# Patient Record
Sex: MALE | Race: WHITE | NOT HISPANIC OR LATINO | URBAN - METROPOLITAN AREA
[De-identification: names, ages, dates, MRNs, and addresses within clinical notes are randomized per-mention and may not be internally consistent; named-entity substitution may affect disease eponyms.]

---

## 2017-11-09 ENCOUNTER — EMERGENCY (EMERGENCY)
Facility: HOSPITAL | Age: 47
LOS: 0 days | Discharge: HOME | End: 2017-11-09

## 2017-11-09 DIAGNOSIS — I10 ESSENTIAL (PRIMARY) HYPERTENSION: ICD-10-CM

## 2017-11-09 DIAGNOSIS — Z79.899 OTHER LONG TERM (CURRENT) DRUG THERAPY: ICD-10-CM

## 2017-11-09 DIAGNOSIS — R55 SYNCOPE AND COLLAPSE: ICD-10-CM

## 2017-11-09 DIAGNOSIS — R73.9 HYPERGLYCEMIA, UNSPECIFIED: ICD-10-CM

## 2017-11-09 DIAGNOSIS — F17.200 NICOTINE DEPENDENCE, UNSPECIFIED, UNCOMPLICATED: ICD-10-CM

## 2017-11-09 DIAGNOSIS — E11.9 TYPE 2 DIABETES MELLITUS WITHOUT COMPLICATIONS: ICD-10-CM

## 2017-11-09 DIAGNOSIS — F32.9 MAJOR DEPRESSIVE DISORDER, SINGLE EPISODE, UNSPECIFIED: ICD-10-CM

## 2017-11-09 DIAGNOSIS — J06.9 ACUTE UPPER RESPIRATORY INFECTION, UNSPECIFIED: ICD-10-CM

## 2017-12-24 ENCOUNTER — TRANSCRIPTION ENCOUNTER (OUTPATIENT)
Age: 47
End: 2017-12-24

## 2018-06-27 ENCOUNTER — TRANSCRIPTION ENCOUNTER (OUTPATIENT)
Age: 48
End: 2018-06-27

## 2018-12-06 NOTE — ASU PATIENT PROFILE, ADULT - PMH
HTN (hypertension) Current mild episode of major depressive disorder, unspecified whether recurrent    HTN (hypertension)    Hyperlipidemia, unspecified hyperlipidemia type    Type 2 diabetes mellitus without complication, without long-term current use of insulin

## 2018-12-07 ENCOUNTER — OUTPATIENT (OUTPATIENT)
Dept: OUTPATIENT SERVICES | Facility: HOSPITAL | Age: 48
LOS: 1 days | Discharge: HOME | End: 2018-12-07

## 2018-12-07 DIAGNOSIS — E11.9 TYPE 2 DIABETES MELLITUS WITHOUT COMPLICATIONS: ICD-10-CM

## 2018-12-07 DIAGNOSIS — I10 ESSENTIAL (PRIMARY) HYPERTENSION: ICD-10-CM

## 2018-12-07 DIAGNOSIS — R94.39 ABNORMAL RESULT OF OTHER CARDIOVASCULAR FUNCTION STUDY: ICD-10-CM

## 2018-12-07 DIAGNOSIS — I25.10 ATHEROSCLEROTIC HEART DISEASE OF NATIVE CORONARY ARTERY WITHOUT ANGINA PECTORIS: ICD-10-CM

## 2018-12-07 DIAGNOSIS — F17.210 NICOTINE DEPENDENCE, CIGARETTES, UNCOMPLICATED: ICD-10-CM

## 2018-12-07 DIAGNOSIS — F32.9 MAJOR DEPRESSIVE DISORDER, SINGLE EPISODE, UNSPECIFIED: ICD-10-CM

## 2018-12-07 LAB — GLUCOSE BLDC GLUCOMTR-MCNC: 136 MG/DL — HIGH (ref 70–99)

## 2018-12-07 RX ORDER — METFORMIN HYDROCHLORIDE 850 MG/1
1 TABLET ORAL
Qty: 0 | Refills: 0 | COMMUNITY

## 2018-12-07 RX ORDER — ATORVASTATIN CALCIUM 80 MG/1
1 TABLET, FILM COATED ORAL
Qty: 30 | Refills: 0
Start: 2018-12-07 | End: 2019-01-05

## 2018-12-07 RX ORDER — ATENOLOL 25 MG/1
1 TABLET ORAL
Qty: 0 | Refills: 0 | COMMUNITY

## 2018-12-07 NOTE — CHART NOTE - NSCHARTNOTEFT_GEN_A_CORE
PRE-OP DIAGNOSIS: suspected CAD    PROCEDURE: [facility]     Physician: Dr. Regalado   Assistant: Dr. Mota    ANESTHESIA TYPE:  [  ]General Anesthesia  [  x] Sedation  [ x] Local/Regional    ESTIMATED BLOOD LOSS:   < 10 mL    CONDITION  [  ] Critical  [  ] Serious  [  ]Fair  [  x]Good      FINDINGS      LEFT HEART CATHETERIZATION       LVEF- 6-%                                 Left main- 30 % stenosis in distal LM     LAD:  normal                       Diag: normal     Left Circumflex: normal   OM: normal     Right Coronary Artery: 40 % stenosis in mid RCA  RPDA  RPL    Ramus Intermed: normal     INTERVENTION  IMPLANTS: none       POST-OP DIAGNOSIS    Non obstructive CAD        PLAN OF CARE  [ x] D/C Home today  [ ]  D/C in AM  [ ] Return to In-patient bed  [ ] Admit for observation  [ ] Return for staged procedure:  [ ] CT Surgery consult called  [ x] ASA, B-blocker & Statin therapy PRE-OP DIAGNOSIS: suspected CAD    PROCEDURE: [facility]     Physician: Dr. Regalado   Assistant: Dr. Mota    ANESTHESIA TYPE:  [  ]General Anesthesia  [  x] Sedation  [ x] Local/Regional    ESTIMATED BLOOD LOSS:   < 10 mL    CONDITION  [  ] Critical  [  ] Serious  [  ]Fair  [  x]Good      FINDINGS      LEFT HEART CATHETERIZATION       LVEF- 60 %                                 Left main- 30 % stenosis in distal LM     LAD:  normal                       Diag: normal     Left Circumflex: normal   OM: normal     Right Coronary Artery: 40 % stenosis in mid RCA  RPDA  RPL    Ramus Intermed: normal     INTERVENTION  IMPLANTS: none       POST-OP DIAGNOSIS    Non obstructive CAD        PLAN OF CARE  [ x] D/C Home today  [ ]  D/C in AM  [ ] Return to In-patient bed  [ ] Admit for observation  [ ] Return for staged procedure:  [ ] CT Surgery consult called  [ x] ASA, B-blocker & Statin therapy PRE-OP DIAGNOSIS: suspected CAD    PROCEDURE: [facility]     Physician: Dr. Regalado   Assistant: Dr. Mota    ANESTHESIA TYPE:  [  ]General Anesthesia  [  x] Sedation  [ x] Local/Regional    ESTIMATED BLOOD LOSS:   < 10 mL    CONDITION  [  ] Critical  [  ] Serious  [  ]Fair  [  x]Good      FINDINGS      LEFT HEART CATHETERIZATION       LVEF- 60 %                                 Left main- 30 % stenosis in distal LM     LAD:  normal                       Diag: normal     Left Circumflex: normal   OM: normal     Right Coronary Artery: 40 % stenosis in mid RCA  RPDA: normal  RPL: normal     Ramus Intermed: normal     INTERVENTION  IMPLANTS: none       POST-OP DIAGNOSIS    Non obstructive CAD        PLAN OF CARE  [ x] D/C Home today  [ ]  D/C in AM  [ ] Return to In-patient bed  [ ] Admit for observation  [ ] Return for staged procedure:  [ ] CT Surgery consult called  [ x] ASA, B-blocker & Statin therapy

## 2018-12-07 NOTE — H&P CARDIOLOGY - HISTORY OF PRESENT ILLNESS
Patient is a 48y Male PMH: HLD, depression, +smoker 1PPD x 30 years, DM, migraines, "spot on kidney". pt had abnormal nuclear stress test 3/18 with anterior ischemia. ws recently hospitalized for CP in [place] and signed out. Pt followed up with Dr. Valdovinos and [facility] recommended         REVIEW OF SYSTEMS:  CONSTITUTIONAL: No fever, weight loss, or fatigue  CARDIOLOGY: PAtient denies chest pain, shortness of breath or syncopal episodes.   RESPIRATORY: denies shortness of breath, wheezing   NEUROLOGICAL: NO weakness, no focal deficits to report.  ENDOCRINOLOGICAL: no recent change in diabetic medications.   GI: no BRBPR, no N,V,diarrhea.     PHYSICAL EXAM:  · CONSTITUTIONAL:	Well-developed, well nourished     ·RESPIRATORY:   airway patent; breath sounds equal; good air movement; respirations non-labored; clear to auscultation bilaterally; no chest wall tenderness; no intercostal retractions; no rales,rhonchi or wheeze  · CARDIOVASCULAR	regular rate and rhythm  no rub  no murmur  normal PMI  · EXTREMITIES: No cyanosis, clubbing or edema  · VASCULAR: 	Equal and normal pulses (carotid, femoral, dorsalis pedis)

## 2019-08-20 ENCOUNTER — EMERGENCY (EMERGENCY)
Facility: HOSPITAL | Age: 49
LOS: 0 days | Discharge: LEFT BEFORE DISCHARGE | End: 2019-08-20
Attending: EMERGENCY MEDICINE | Admitting: EMERGENCY MEDICINE

## 2019-08-20 ENCOUNTER — OUTPATIENT (OUTPATIENT)
Dept: OUTPATIENT SERVICES | Facility: HOSPITAL | Age: 49
LOS: 1 days | Discharge: HOME | End: 2019-08-20

## 2019-08-20 VITALS
DIASTOLIC BLOOD PRESSURE: 127 MMHG | SYSTOLIC BLOOD PRESSURE: 217 MMHG | HEART RATE: 112 BPM | OXYGEN SATURATION: 98 % | TEMPERATURE: 97 F | RESPIRATION RATE: 20 BRPM

## 2019-08-20 DIAGNOSIS — I10 ESSENTIAL (PRIMARY) HYPERTENSION: ICD-10-CM

## 2019-08-20 DIAGNOSIS — K02.62 DENTAL CARIES ON SMOOTH SURFACE PENETRATING INTO DENTIN: ICD-10-CM

## 2019-08-20 DIAGNOSIS — Z79.82 LONG TERM (CURRENT) USE OF ASPIRIN: ICD-10-CM

## 2019-08-20 DIAGNOSIS — Z79.899 OTHER LONG TERM (CURRENT) DRUG THERAPY: ICD-10-CM

## 2019-08-20 PROBLEM — F32.0 MAJOR DEPRESSIVE DISORDER, SINGLE EPISODE, MILD: Chronic | Status: ACTIVE | Noted: 2018-12-07

## 2019-08-20 PROBLEM — E78.5 HYPERLIPIDEMIA, UNSPECIFIED: Chronic | Status: ACTIVE | Noted: 2018-12-07

## 2019-08-20 PROBLEM — E11.9 TYPE 2 DIABETES MELLITUS WITHOUT COMPLICATIONS: Chronic | Status: ACTIVE | Noted: 2018-12-07

## 2019-08-20 NOTE — ED ADULT TRIAGE NOTE - CHIEF COMPLAINT QUOTE
pt has hypertension has not taken his bp meds in three days, pt has family member in hosptial and is worried about them

## 2023-11-28 ENCOUNTER — INPATIENT (INPATIENT)
Facility: HOSPITAL | Age: 53
LOS: 1 days | Discharge: LEFT BEFORE DISCHARGE | DRG: 190 | End: 2023-11-30
Attending: STUDENT IN AN ORGANIZED HEALTH CARE EDUCATION/TRAINING PROGRAM | Admitting: STUDENT IN AN ORGANIZED HEALTH CARE EDUCATION/TRAINING PROGRAM
Payer: MEDICAID

## 2023-11-28 VITALS
DIASTOLIC BLOOD PRESSURE: 101 MMHG | OXYGEN SATURATION: 97 % | SYSTOLIC BLOOD PRESSURE: 216 MMHG | WEIGHT: 270.07 LBS | HEIGHT: 69 IN | RESPIRATION RATE: 20 BRPM | TEMPERATURE: 98 F | HEART RATE: 71 BPM

## 2023-11-28 DIAGNOSIS — R07.9 CHEST PAIN, UNSPECIFIED: ICD-10-CM

## 2023-11-28 LAB
ALBUMIN SERPL ELPH-MCNC: 4.2 G/DL — SIGNIFICANT CHANGE UP (ref 3.5–5.2)
ALBUMIN SERPL ELPH-MCNC: 4.2 G/DL — SIGNIFICANT CHANGE UP (ref 3.5–5.2)
ALP SERPL-CCNC: 113 U/L — SIGNIFICANT CHANGE UP (ref 30–115)
ALP SERPL-CCNC: 113 U/L — SIGNIFICANT CHANGE UP (ref 30–115)
ALT FLD-CCNC: 31 U/L — SIGNIFICANT CHANGE UP (ref 0–41)
ALT FLD-CCNC: 31 U/L — SIGNIFICANT CHANGE UP (ref 0–41)
ANION GAP SERPL CALC-SCNC: 12 MMOL/L — SIGNIFICANT CHANGE UP (ref 7–14)
ANION GAP SERPL CALC-SCNC: 12 MMOL/L — SIGNIFICANT CHANGE UP (ref 7–14)
AST SERPL-CCNC: 23 U/L — SIGNIFICANT CHANGE UP (ref 0–41)
AST SERPL-CCNC: 23 U/L — SIGNIFICANT CHANGE UP (ref 0–41)
BASOPHILS # BLD AUTO: 0.08 K/UL — SIGNIFICANT CHANGE UP (ref 0–0.2)
BASOPHILS # BLD AUTO: 0.08 K/UL — SIGNIFICANT CHANGE UP (ref 0–0.2)
BASOPHILS NFR BLD AUTO: 0.7 % — SIGNIFICANT CHANGE UP (ref 0–1)
BASOPHILS NFR BLD AUTO: 0.7 % — SIGNIFICANT CHANGE UP (ref 0–1)
BILIRUB SERPL-MCNC: 0.4 MG/DL — SIGNIFICANT CHANGE UP (ref 0.2–1.2)
BILIRUB SERPL-MCNC: 0.4 MG/DL — SIGNIFICANT CHANGE UP (ref 0.2–1.2)
BUN SERPL-MCNC: 25 MG/DL — HIGH (ref 10–20)
BUN SERPL-MCNC: 25 MG/DL — HIGH (ref 10–20)
CALCIUM SERPL-MCNC: 9.8 MG/DL — SIGNIFICANT CHANGE UP (ref 8.4–10.5)
CALCIUM SERPL-MCNC: 9.8 MG/DL — SIGNIFICANT CHANGE UP (ref 8.4–10.5)
CHLORIDE SERPL-SCNC: 104 MMOL/L — SIGNIFICANT CHANGE UP (ref 98–110)
CHLORIDE SERPL-SCNC: 104 MMOL/L — SIGNIFICANT CHANGE UP (ref 98–110)
CO2 SERPL-SCNC: 26 MMOL/L — SIGNIFICANT CHANGE UP (ref 17–32)
CO2 SERPL-SCNC: 26 MMOL/L — SIGNIFICANT CHANGE UP (ref 17–32)
CREAT SERPL-MCNC: 1.5 MG/DL — SIGNIFICANT CHANGE UP (ref 0.7–1.5)
CREAT SERPL-MCNC: 1.5 MG/DL — SIGNIFICANT CHANGE UP (ref 0.7–1.5)
EGFR: 55 ML/MIN/1.73M2 — LOW
EGFR: 55 ML/MIN/1.73M2 — LOW
EOSINOPHIL # BLD AUTO: 0.29 K/UL — SIGNIFICANT CHANGE UP (ref 0–0.7)
EOSINOPHIL # BLD AUTO: 0.29 K/UL — SIGNIFICANT CHANGE UP (ref 0–0.7)
EOSINOPHIL NFR BLD AUTO: 2.7 % — SIGNIFICANT CHANGE UP (ref 0–8)
EOSINOPHIL NFR BLD AUTO: 2.7 % — SIGNIFICANT CHANGE UP (ref 0–8)
GLUCOSE SERPL-MCNC: 118 MG/DL — HIGH (ref 70–99)
GLUCOSE SERPL-MCNC: 118 MG/DL — HIGH (ref 70–99)
HCT VFR BLD CALC: 49.4 % — SIGNIFICANT CHANGE UP (ref 42–52)
HCT VFR BLD CALC: 49.4 % — SIGNIFICANT CHANGE UP (ref 42–52)
HGB BLD-MCNC: 16.8 G/DL — SIGNIFICANT CHANGE UP (ref 14–18)
HGB BLD-MCNC: 16.8 G/DL — SIGNIFICANT CHANGE UP (ref 14–18)
IMM GRANULOCYTES NFR BLD AUTO: 1.2 % — HIGH (ref 0.1–0.3)
IMM GRANULOCYTES NFR BLD AUTO: 1.2 % — HIGH (ref 0.1–0.3)
LYMPHOCYTES # BLD AUTO: 1.97 K/UL — SIGNIFICANT CHANGE UP (ref 1.2–3.4)
LYMPHOCYTES # BLD AUTO: 1.97 K/UL — SIGNIFICANT CHANGE UP (ref 1.2–3.4)
LYMPHOCYTES # BLD AUTO: 18.2 % — LOW (ref 20.5–51.1)
LYMPHOCYTES # BLD AUTO: 18.2 % — LOW (ref 20.5–51.1)
MAGNESIUM SERPL-MCNC: 2.3 MG/DL — SIGNIFICANT CHANGE UP (ref 1.8–2.4)
MAGNESIUM SERPL-MCNC: 2.3 MG/DL — SIGNIFICANT CHANGE UP (ref 1.8–2.4)
MCHC RBC-ENTMCNC: 29.7 PG — SIGNIFICANT CHANGE UP (ref 27–31)
MCHC RBC-ENTMCNC: 29.7 PG — SIGNIFICANT CHANGE UP (ref 27–31)
MCHC RBC-ENTMCNC: 34 G/DL — SIGNIFICANT CHANGE UP (ref 32–37)
MCHC RBC-ENTMCNC: 34 G/DL — SIGNIFICANT CHANGE UP (ref 32–37)
MCV RBC AUTO: 87.4 FL — SIGNIFICANT CHANGE UP (ref 80–94)
MCV RBC AUTO: 87.4 FL — SIGNIFICANT CHANGE UP (ref 80–94)
MONOCYTES # BLD AUTO: 0.83 K/UL — HIGH (ref 0.1–0.6)
MONOCYTES # BLD AUTO: 0.83 K/UL — HIGH (ref 0.1–0.6)
MONOCYTES NFR BLD AUTO: 7.7 % — SIGNIFICANT CHANGE UP (ref 1.7–9.3)
MONOCYTES NFR BLD AUTO: 7.7 % — SIGNIFICANT CHANGE UP (ref 1.7–9.3)
NEUTROPHILS # BLD AUTO: 7.5 K/UL — HIGH (ref 1.4–6.5)
NEUTROPHILS # BLD AUTO: 7.5 K/UL — HIGH (ref 1.4–6.5)
NEUTROPHILS NFR BLD AUTO: 69.5 % — SIGNIFICANT CHANGE UP (ref 42.2–75.2)
NEUTROPHILS NFR BLD AUTO: 69.5 % — SIGNIFICANT CHANGE UP (ref 42.2–75.2)
NRBC # BLD: 0 /100 WBCS — SIGNIFICANT CHANGE UP (ref 0–0)
NRBC # BLD: 0 /100 WBCS — SIGNIFICANT CHANGE UP (ref 0–0)
NT-PROBNP SERPL-SCNC: 1691 PG/ML — HIGH (ref 0–300)
NT-PROBNP SERPL-SCNC: 1691 PG/ML — HIGH (ref 0–300)
PLATELET # BLD AUTO: 234 K/UL — SIGNIFICANT CHANGE UP (ref 130–400)
PLATELET # BLD AUTO: 234 K/UL — SIGNIFICANT CHANGE UP (ref 130–400)
PMV BLD: 11.5 FL — HIGH (ref 7.4–10.4)
PMV BLD: 11.5 FL — HIGH (ref 7.4–10.4)
POTASSIUM SERPL-MCNC: 4.8 MMOL/L — SIGNIFICANT CHANGE UP (ref 3.5–5)
POTASSIUM SERPL-MCNC: 4.8 MMOL/L — SIGNIFICANT CHANGE UP (ref 3.5–5)
POTASSIUM SERPL-SCNC: 4.8 MMOL/L — SIGNIFICANT CHANGE UP (ref 3.5–5)
POTASSIUM SERPL-SCNC: 4.8 MMOL/L — SIGNIFICANT CHANGE UP (ref 3.5–5)
PROT SERPL-MCNC: 6.8 G/DL — SIGNIFICANT CHANGE UP (ref 6–8)
PROT SERPL-MCNC: 6.8 G/DL — SIGNIFICANT CHANGE UP (ref 6–8)
RBC # BLD: 5.65 M/UL — SIGNIFICANT CHANGE UP (ref 4.7–6.1)
RBC # BLD: 5.65 M/UL — SIGNIFICANT CHANGE UP (ref 4.7–6.1)
RBC # FLD: 14.3 % — SIGNIFICANT CHANGE UP (ref 11.5–14.5)
RBC # FLD: 14.3 % — SIGNIFICANT CHANGE UP (ref 11.5–14.5)
SODIUM SERPL-SCNC: 142 MMOL/L — SIGNIFICANT CHANGE UP (ref 135–146)
SODIUM SERPL-SCNC: 142 MMOL/L — SIGNIFICANT CHANGE UP (ref 135–146)
TROPONIN T SERPL-MCNC: 0.02 NG/ML — HIGH
WBC # BLD: 10.8 K/UL — SIGNIFICANT CHANGE UP (ref 4.8–10.8)
WBC # BLD: 10.8 K/UL — SIGNIFICANT CHANGE UP (ref 4.8–10.8)
WBC # FLD AUTO: 10.8 K/UL — SIGNIFICANT CHANGE UP (ref 4.8–10.8)
WBC # FLD AUTO: 10.8 K/UL — SIGNIFICANT CHANGE UP (ref 4.8–10.8)

## 2023-11-28 PROCEDURE — 93005 ELECTROCARDIOGRAM TRACING: CPT

## 2023-11-28 PROCEDURE — 85730 THROMBOPLASTIN TIME PARTIAL: CPT

## 2023-11-28 PROCEDURE — 85027 COMPLETE CBC AUTOMATED: CPT

## 2023-11-28 PROCEDURE — C1769: CPT

## 2023-11-28 PROCEDURE — 85610 PROTHROMBIN TIME: CPT

## 2023-11-28 PROCEDURE — 92978 ENDOLUMINL IVUS OCT C 1ST: CPT | Mod: LM

## 2023-11-28 PROCEDURE — 84443 ASSAY THYROID STIM HORMONE: CPT

## 2023-11-28 PROCEDURE — C1894: CPT

## 2023-11-28 PROCEDURE — 36415 COLL VENOUS BLD VENIPUNCTURE: CPT

## 2023-11-28 PROCEDURE — 93306 TTE W/DOPPLER COMPLETE: CPT

## 2023-11-28 PROCEDURE — 82962 GLUCOSE BLOOD TEST: CPT

## 2023-11-28 PROCEDURE — 83036 HEMOGLOBIN GLYCOSYLATED A1C: CPT

## 2023-11-28 PROCEDURE — 93458 L HRT ARTERY/VENTRICLE ANGIO: CPT

## 2023-11-28 PROCEDURE — 80061 LIPID PANEL: CPT

## 2023-11-28 PROCEDURE — C1753: CPT

## 2023-11-28 PROCEDURE — 71045 X-RAY EXAM CHEST 1 VIEW: CPT | Mod: 26

## 2023-11-28 PROCEDURE — 80048 BASIC METABOLIC PNL TOTAL CA: CPT

## 2023-11-28 PROCEDURE — 83735 ASSAY OF MAGNESIUM: CPT

## 2023-11-28 PROCEDURE — C1887: CPT

## 2023-11-28 PROCEDURE — 99285 EMERGENCY DEPT VISIT HI MDM: CPT

## 2023-11-28 PROCEDURE — 84484 ASSAY OF TROPONIN QUANT: CPT

## 2023-11-28 PROCEDURE — 93010 ELECTROCARDIOGRAM REPORT: CPT

## 2023-11-28 RX ORDER — DEXTROSE 50 % IN WATER 50 %
15 SYRINGE (ML) INTRAVENOUS ONCE
Refills: 0 | Status: DISCONTINUED | OUTPATIENT
Start: 2023-11-28 | End: 2023-11-30

## 2023-11-28 RX ORDER — SODIUM CHLORIDE 9 MG/ML
1000 INJECTION, SOLUTION INTRAVENOUS
Refills: 0 | Status: DISCONTINUED | OUTPATIENT
Start: 2023-11-28 | End: 2023-11-30

## 2023-11-28 RX ORDER — ATORVASTATIN CALCIUM 80 MG/1
40 TABLET, FILM COATED ORAL AT BEDTIME
Refills: 0 | Status: DISCONTINUED | OUTPATIENT
Start: 2023-11-28 | End: 2023-11-29

## 2023-11-28 RX ORDER — ATENOLOL 25 MG/1
1 TABLET ORAL
Refills: 0 | DISCHARGE

## 2023-11-28 RX ORDER — DEXTROSE 50 % IN WATER 50 %
25 SYRINGE (ML) INTRAVENOUS ONCE
Refills: 0 | Status: DISCONTINUED | OUTPATIENT
Start: 2023-11-28 | End: 2023-11-30

## 2023-11-28 RX ORDER — DEXTROSE 50 % IN WATER 50 %
12.5 SYRINGE (ML) INTRAVENOUS ONCE
Refills: 0 | Status: DISCONTINUED | OUTPATIENT
Start: 2023-11-28 | End: 2023-11-30

## 2023-11-28 RX ORDER — AMLODIPINE BESYLATE 2.5 MG/1
5 TABLET ORAL DAILY
Refills: 0 | Status: DISCONTINUED | OUTPATIENT
Start: 2023-11-28 | End: 2023-11-29

## 2023-11-28 RX ORDER — ALBUTEROL 90 UG/1
2 AEROSOL, METERED ORAL EVERY 6 HOURS
Refills: 0 | Status: DISCONTINUED | OUTPATIENT
Start: 2023-11-28 | End: 2023-11-30

## 2023-11-28 RX ORDER — GLUCAGON INJECTION, SOLUTION 0.5 MG/.1ML
1 INJECTION, SOLUTION SUBCUTANEOUS ONCE
Refills: 0 | Status: DISCONTINUED | OUTPATIENT
Start: 2023-11-28 | End: 2023-11-30

## 2023-11-28 RX ORDER — INSULIN LISPRO 100/ML
VIAL (ML) SUBCUTANEOUS
Refills: 0 | Status: DISCONTINUED | OUTPATIENT
Start: 2023-11-28 | End: 2023-11-30

## 2023-11-28 RX ORDER — METOPROLOL TARTRATE 50 MG
25 TABLET ORAL DAILY
Refills: 0 | Status: DISCONTINUED | OUTPATIENT
Start: 2023-11-28 | End: 2023-11-28

## 2023-11-28 RX ORDER — METFORMIN HYDROCHLORIDE 850 MG/1
1 TABLET ORAL
Qty: 0 | Refills: 0 | DISCHARGE

## 2023-11-28 RX ORDER — SODIUM CHLORIDE 9 MG/ML
1000 INJECTION INTRAMUSCULAR; INTRAVENOUS; SUBCUTANEOUS
Refills: 0 | Status: DISCONTINUED | OUTPATIENT
Start: 2023-11-28 | End: 2023-11-29

## 2023-11-28 RX ORDER — FUROSEMIDE 40 MG
40 TABLET ORAL ONCE
Refills: 0 | Status: COMPLETED | OUTPATIENT
Start: 2023-11-28 | End: 2023-11-28

## 2023-11-28 RX ORDER — ASPIRIN/CALCIUM CARB/MAGNESIUM 324 MG
81 TABLET ORAL DAILY
Refills: 0 | Status: DISCONTINUED | OUTPATIENT
Start: 2023-11-28 | End: 2023-11-30

## 2023-11-28 RX ORDER — CARVEDILOL PHOSPHATE 80 MG/1
12.5 CAPSULE, EXTENDED RELEASE ORAL EVERY 12 HOURS
Refills: 0 | Status: DISCONTINUED | OUTPATIENT
Start: 2023-11-28 | End: 2023-11-30

## 2023-11-28 RX ORDER — ALBUTEROL 90 UG/1
2 AEROSOL, METERED ORAL
Refills: 0 | DISCHARGE

## 2023-11-28 RX ORDER — METOPROLOL TARTRATE 50 MG
1 TABLET ORAL
Qty: 0 | Refills: 0 | DISCHARGE

## 2023-11-28 RX ORDER — LOSARTAN POTASSIUM 100 MG/1
100 TABLET, FILM COATED ORAL DAILY
Refills: 0 | Status: DISCONTINUED | OUTPATIENT
Start: 2023-11-28 | End: 2023-11-30

## 2023-11-28 RX ORDER — ASPIRIN/CALCIUM CARB/MAGNESIUM 324 MG
324 TABLET ORAL ONCE
Refills: 0 | Status: COMPLETED | OUTPATIENT
Start: 2023-11-28 | End: 2023-11-28

## 2023-11-28 RX ADMIN — Medication 40 MILLIGRAM(S): at 18:12

## 2023-11-28 RX ADMIN — ATORVASTATIN CALCIUM 40 MILLIGRAM(S): 80 TABLET, FILM COATED ORAL at 21:41

## 2023-11-28 RX ADMIN — Medication 25 MILLIGRAM(S): at 15:24

## 2023-11-28 RX ADMIN — CARVEDILOL PHOSPHATE 12.5 MILLIGRAM(S): 80 CAPSULE, EXTENDED RELEASE ORAL at 17:15

## 2023-11-28 RX ADMIN — Medication 324 MILLIGRAM(S): at 13:20

## 2023-11-28 RX ADMIN — AMLODIPINE BESYLATE 5 MILLIGRAM(S): 2.5 TABLET ORAL at 15:05

## 2023-11-28 NOTE — ED PROVIDER NOTE - PRIOR EKG STATUS
there is no prior EKG available for comparison + change from 2017 ekg/there is no prior EKG available for comparison

## 2023-11-28 NOTE — ED PROVIDER NOTE - ATTENDING CONTRIBUTION TO CARE
not applicable
53-year-old male past medical history significant for hypertension, dyslipidemia, diabetes, 1 pack/day smoker, obese, COPD, seen at an urgent care center 6 days ago with chest pain and sob, was treated with steroids and an inhaler and was told he had an abnormal EKG and then was called and told he has an elevated troponin subsequently.  Patient was instructed to present to the ED at that time.  Patient now reports intermittent shortness of breath over the past 6 days.  No chest pain over the past 6 days or currently.  Patient reports generalized weakness and myalgias for the past 2-3 days.  No fever, cough, URI symptoms.  No abdominal pain.  No leg pain or edema.  Patient ports he had a cardiac cath 2018.  Cath reviewed with 30% left main disease.  Vitals noted.  CONSTITUTIONAL: Well-appearing; obese; in no apparent distress.   HEAD: Normocephalic; atraumatic.   EYES: PERRL; EOM intact. Conjunctiva normal B/L.   ENT: Normal pharynx with no tonsillar hypertrophy. MMM.  NECK: Supple; non-tender; no cervical lymphadenopathy.   CHEST: Normal chest excursion with respiration.   CARDIOVASCULAR: Normal S1, S2; no murmurs, rubs, or gallops.   RESPIRATORY: Normal chest excursion with respiration; breath sounds clear and equal bilaterally; no wheezes, rhonchi, or rales.  GI/: Normal bowel sounds; non-distended; non-tender.  BACK: No evidence of trauma or deformity. Non-tender to palpation. No CVA tenderness.   EXT: Normal ROM in all four extremities; non-tender to palpation; distal pulses are normal. No leg edema B/L.   SKIN: Normal for age and race; warm; dry; good turgor.  NEURO: A & O x 4; CN 2-12 intact. Grossly unremarkable.

## 2023-11-28 NOTE — H&P ADULT - NSICDXFAMILYHX_GEN_ALL_CORE_FT
FAMILY HISTORY:  No pertinent family history in first degree relatives FAMILY HISTORY:  Father  Still living? No  FH: myocardial infarction, Age at diagnosis: Age Unknown    Mother  Still living? Unknown  FH: HTN (hypertension), Age at diagnosis: Age Unknown

## 2023-11-28 NOTE — PATIENT PROFILE ADULT - FUNCTIONAL ASSESSMENT - DAILY ACTIVITY ASSESSMENT TYPE
[facility]uh[facility] Jaimes does not require auth for two 2W ZIO XT's or the hook-up per their online precert resource tool. Admission

## 2023-11-28 NOTE — H&P ADULT - HISTORY OF PRESENT ILLNESS
Patient is a 53 year old male with a 40 pack year smoking history and a pmhx of HTN, HLD, diabetes, and COPD presenting to the emergency department with 6 days of shortness of breath. Patient states 6 days ago he suddenly felt short of breath and mild chest pain prompting him to go to an urgent care, He reports the urgent care stated he had an abnormal EKG and elevated troponin level. Shortness of breath has only worsened over the past 6 days and has also been accompanied by extreme fatigue and one episode of dizziness .Patient states he had a LHC years ago due to HTN and lower extremity edema, but he is unsure of the results. Patient admits to needing to sleep with several pillows in order to breathe comfortably, admits to multiple episodes of orthopnea and PND. Patient also admits to lower extremity edema for the past several years. Denies nausea/ vomiting, palpitations, fever, syncope and abdominal pain. Patient is a 53 year old male with a 40 pack year smoking history and a pmhx of HTN, HLD, diabetes, and COPD presenting to the emergency department with 6 days of shortness of breath. Patient states 6 days ago he suddenly felt short of breath with mild chest pain prompting him to go to an urgent care, He reports the urgent care stated he had an abnormal EKG and sent him to the ED for further evaluation He reports shortness of breath has only worsened over the past 6 days and has also been accompanied by extreme fatigue and one episode of dizziness but denies LOC. Patient states he had a LHC years ago due to HTN and lower extremity edema, but he is unsure of the results. Patient admits to needing to sleep with several pillows in order to breathe comfortably, admits to multiple episodes of orthopnea and PND. Patient also admits to lower extremity edema for the past several years. Denies nausea/ vomiting, palpitations, fever, syncope and abdominal pain. In ED troponin was 0.02 and BNP was 1691. 12 Lead EKG showed SR with LVH with strain pattern. His SBP was 216 on arrival but improved to the 170s after addition of home medications.  Patient is a 53 year old male with a 40 pack year smoking history and a pmhx of HTN, HLD, diabetes, and COPD presenting to the emergency department with 6 days of shortness of breath. Patient states 6 days ago he suddenly felt short of breath with mild chest pain prompting him to go to an urgent care, He reports the urgent care stated he had an abnormal EKG and elevated troponin and sent him to the ED for further evaluation. HE did not go because he had work however.  He reports shortness of breath has only worsened over the past 6 days and has also been accompanied by extreme fatigue and one episode of dizziness but denies LOC. Patient states he had a LHC years ago due to HTN and lower extremity edema, but he is unsure of the results. Patient admits to needing to sleep with several pillows in order to breathe comfortably, admits to multiple episodes of orthopnea and PND. Patient also admits to lower extremity edema for the past several years. Denies nausea/ vomiting, palpitations, fever, syncope and abdominal pain. In ED troponin was 0.02 and BNP was 1691. 12 Lead EKG showed SR with LVH with strain pattern. His SBP was 216 on arrival but improved to the 170s after addition of home medications.

## 2023-11-28 NOTE — ED ADULT TRIAGE NOTE - CHIEF COMPLAINT QUOTE
pt c/o cp and sob z2 days, worse with exertion. pt reports going to urgent care and having abnormal ekg and elevated trop

## 2023-11-28 NOTE — H&P ADULT - ASSESSMENT
Patient is a 53 year old male with a 40 pack year smoking history and a pmhx of HTN, HLD, diabetes, and COPD presenting to the emergency department with 6 days of shortness of breath. Patient will be admitted to  and plan is as follows:     #NSTEMI?  trop 0.03  f/u trop  LHC  monitor on telemetry  tte  EKG in am    #HTN:  BP on admission 216/101 HTN emergency  monitor BP and consider giving additional med for better blood pressure control  continue atenelol 100mg  continue losartan 100mg     #HLD:  f/u lipid panel    #DM:  continue metformin    #COPD Patient is a 53 year old male with a 40 pack year smoking history and a pmhx of HTN, HLD, diabetes, and COPD presenting to the emergency department with 6 days of shortness of breath and chest pain with elevated troponin in the setting of hypertensive urgency.     #NSTEMI  trop 0.03  Check serial trops to peak  Check TTE  Add High intensity STATIN  Cont PO BB  Repeat EKG in AM  Plan for LHC in AM    #HTN:  BP on admission 216/101 HTN emergency  monitor BP and consider giving additional med for better blood pressure control  Change atenolol to coreg 12.5mg PO BID, titrate as needed  continue losartan 100mg   Norvasc 5mg PO Daily added    #HLD:  f/u lipid panel  Lipitor 40mg PO QHS started    #DM:  Check Hgb A1C in AM  Start Insulin SS  Hold oral agents    #COPD Patient is a 53 year old male with a 40 pack year smoking history and a pmhx of HTN, HLD, diabetes, and COPD presenting to the emergency department with 6 days of shortness of breath and chest pain with elevated troponin in the setting of hypertensive urgency.     #NSTEMI  trop 0.03  Check serial trops to peak  Check TTE  Add High intensity STATIN  Cont PO BB  Repeat EKG in AM  Plan for LHC in AM    # Volume overload?  Elevated BNP  Right sided HF?  AS?  -Will give Lasix 40mg IVPx 1 and re-evaluate in the AM    #HTN:  BP on admission 216/101 HTN emergency  monitor BP and consider giving additional med for better blood pressure control  Change atenolol to coreg 12.5mg PO BID, titrate as needed  continue losartan 100mg   Norvasc 5mg PO Daily added    #HLD:  f/u lipid panel  Lipitor 40mg PO QHS started    #DM:  Check Hgb A1C in AM  Start Insulin SS  Hold oral agents    #COPD  Stable

## 2023-11-28 NOTE — ED PROVIDER NOTE - OBJECTIVE STATEMENT
54yo PMHx HTN, HLD, DM, 1 ppd smoker, presenting with 6 days of chest pain. 52yo PMHx HTN, HLD, DM, 1 ppd smoker, presenting with 6 days of chest pain. 52yo PMHx HTN, HLD, DM, 1 ppd smoker, presenting with 6 days of chest pain w/o associated SOB/MERIDA, no recnet n/v. +generalized weakness during this time.    Pt initially went to UC, told he had abnormal EKG and "positive troponin", instructed to come to ED, which he delayed several days before being convinced by family.

## 2023-11-28 NOTE — PATIENT PROFILE ADULT - FALL HARM RISK - HARM RISK INTERVENTIONS

## 2023-11-28 NOTE — H&P ADULT - NS ATTEND AMEND GEN_ALL_CORE FT
52 yo male with hx of non-occlusive CAD, HTN, HL, DM, COPD, 40 PY smoking hx who presented with SOB and mild chest tightness since Weds. He went to an urgent care that day at states he was told he had an elevated troponin and abnormal EKG but did not go to the ED. He had Monday off so went to the ED then.     In ED his troponin was 0.02 without a positive delta. BNP 1600. EKG with LVH with inferior and lateral TWI possibly 2/2 to LVH. SBP was 216 on admission.    1. NSTEMI  2. HTN urgency  3. HLD    Plan:  - NPO for cardiac cath today  - obtain TTE  - increase atorvastatin to 80mg qd  - continue ASA  - transitioned atenolol to carvedilol 12.5mg BID  - continue losartan 100 mg qd  - added amlodipine and increased to 10mg qd  - monitor fluid status, I/Os, consider additional diuretic (s/p 40 IV lasix)  - cardiac telemetry

## 2023-11-28 NOTE — ED PROVIDER NOTE - CLINICAL SUMMARY MEDICAL DECISION MAKING FREE TEXT BOX
53-year-old male past medical history as documented presented to the ED with shortness of breath for 6 days.  Patient was seen in urgent care center 6 days prior to ED presentation with chest pain and told subsequent to the visit that he an elevated troponin.  Patient hypertensive on arrival to the ED.  Exam normal.  EKG with ST depressions and inversions in inferior leads unchanged from prior EKG on file.  Troponin 0.02.  All labs reviewed.  Cardiology consulted.  Patient admitted to telemetry.

## 2023-11-28 NOTE — H&P ADULT - NSHPPHYSICALEXAM_GEN_ALL_CORE
ICU Vital Signs Last 24 Hrs  T(C): 36.9 (28 Nov 2023 13:25), Max: 36.9 (28 Nov 2023 13:25)  T(F): 98.5 (28 Nov 2023 13:25), Max: 98.5 (28 Nov 2023 13:25)  HR: 70 (28 Nov 2023 13:25) (70 - 71)  BP: 180/99 (28 Nov 2023 13:25) (180/99 - 216/101)  BP(mean): --  ABP: --  ABP(mean): --  RR: 19 (28 Nov 2023 13:25) (19 - 20)  SpO2: 98% (28 Nov 2023 13:25) (97% - 98%)    O2 Parameters below as of 28 Nov 2023 13:25  Patient On (Oxygen Delivery Method): room air      Respiratory: clear to auscultation bilaterally   Cardiac: regular rate and rhythm   2+ pitting edema bilaterally VITALS:   T(C): 36.6 (11-28-23 @ 17:24), Max: 36.9 (11-28-23 @ 13:25)  HR: 65 (11-28-23 @ 17:24) (65 - 80)  BP: 175/105 (11-28-23 @ 14:00) (175/105 - 216/101)  RR: 18 (11-28-23 @ 17:24) (18 - 20)  SpO2: 98% (11-28-23 @ 17:24) (97% - 99%)    GENERAL: NAD, lying in bed comfortably  HEAD:  Atraumatic, normocephalic  EYES: EOMI, PERRLA, conjunctiva and sclera clear  ENT: Moist mucous membranes  NECK: Supple, no JVD  HEART: Regular rate and rhythm, I/VI systolic murmur, rubs, or gallops  LUNGS: Unlabored respirations.  Clear to auscultation bilaterally, no crackles, wheezing, or rhonchi  ABDOMEN: Soft, nontender, nondistended, +BS  EXTREMITIES: 2+ peripheral pulses bilaterally. No clubbing, cyanosis, or edema  NERVOUS SYSTEM:  A&Ox3, no focal deficits   SKIN: No rashes or lesions

## 2023-11-28 NOTE — ED ADULT NURSE NOTE - OBJECTIVE STATEMENT
patient presented to the ED with c/o SOB x days, went to [facility] and had abnormal EKG and elevated Troponin level.

## 2023-11-28 NOTE — ED ADULT NURSE NOTE - NSICDXPASTMEDICALHX_GEN_ALL_CORE_FT
PAST MEDICAL HISTORY:  Current mild episode of major depressive disorder, unspecified whether recurrent     HTN (hypertension)     Hyperlipidemia, unspecified hyperlipidemia type     Type 2 diabetes mellitus without complication, without long-term current use of insulin

## 2023-11-28 NOTE — CHART NOTE - NSCHARTNOTEFT_GEN_A_CORE
EKG reviewed and case discussed with interventional cardiologist on call Dr Busby.   54 YO M with PMHx of HTN, HLD, DM type II, active smoker presents with MERIDA after walking 1 block. States the SOB has been getting worse for the past few days. Had a concerning EKG and elevated troponin at urgent care.  EKG in the ED consistent with LVH with strain. Would recommend routine labs. 2D Echo.  Cardiology team to follow. EKG reviewed and case discussed with interventional cardiologist on call Dr Busby.   52 YO M with PMHx of HTN, HLD, DM type II, active smoker presents with MERIDA after walking 1 block. States the SOB has been getting worse for the past few days. Had a concerning EKG and elevated troponin at urgent care.  EKG in the ED consistent with LVH with strain. Would recommend routine labs. 2D Echo.  Cardiology team to follow.

## 2023-11-28 NOTE — H&P ADULT - NSHPLABSRESULTS_GEN_ALL_CORE
- Telemetry:  - ECG (date***):   - Echo (date***):  - Radiology:    - Labs:                        16.8   10.80 )-----------( 234      ( 28 Nov 2023 12:50 )             49.4     11-28    142  |  104  |  25<H>  ----------------------------<  118<H>  4.8   |  26  |  1.5    Ca    9.8      28 Nov 2023 12:50  Mg     2.3     11-28    TPro  6.8  /  Alb  4.2  /  TBili  0.4  /  DBili  x   /  AST  23  /  ALT  31  /  AlkPhos  113  11-28    LIVER FUNCTIONS - ( 28 Nov 2023 12:50 )  Alb: 4.2 g/dL / Pro: 6.8 g/dL / ALK PHOS: 113 U/L / ALT: 31 U/L / AST: 23 U/L / GGT: x             Troponin T, Serum: 0.02 ng/mL (11-28 @ 12:50)    CARDIAC MARKERS ( 28 Nov 2023 12:50 )  x     / 0.02 ng/mL / x     / x     / x            Lactate Trend    Urinalysis Basic - ( 28 Nov 2023 12:50 )    Color: x / Appearance: x / SG: x / pH: x  Gluc: 118 mg/dL / Ketone: x  / Bili: x / Urobili: x   Blood: x / Protein: x / Nitrite: x   Leuk Esterase: x / RBC: x / WBC x   Sq Epi: x / Non Sq Epi: x / Bacteria: x

## 2023-11-29 LAB
A1C WITH ESTIMATED AVERAGE GLUCOSE RESULT: 6.4 % — HIGH (ref 4–5.6)
A1C WITH ESTIMATED AVERAGE GLUCOSE RESULT: 6.4 % — HIGH (ref 4–5.6)
ANION GAP SERPL CALC-SCNC: 10 MMOL/L — SIGNIFICANT CHANGE UP (ref 7–14)
ANION GAP SERPL CALC-SCNC: 10 MMOL/L — SIGNIFICANT CHANGE UP (ref 7–14)
APTT BLD: 30.6 SEC — SIGNIFICANT CHANGE UP (ref 27–39.2)
APTT BLD: 30.6 SEC — SIGNIFICANT CHANGE UP (ref 27–39.2)
BUN SERPL-MCNC: 22 MG/DL — HIGH (ref 10–20)
BUN SERPL-MCNC: 22 MG/DL — HIGH (ref 10–20)
CALCIUM SERPL-MCNC: 9 MG/DL — SIGNIFICANT CHANGE UP (ref 8.4–10.5)
CALCIUM SERPL-MCNC: 9 MG/DL — SIGNIFICANT CHANGE UP (ref 8.4–10.5)
CHLORIDE SERPL-SCNC: 106 MMOL/L — SIGNIFICANT CHANGE UP (ref 98–110)
CHLORIDE SERPL-SCNC: 106 MMOL/L — SIGNIFICANT CHANGE UP (ref 98–110)
CHOLEST SERPL-MCNC: 157 MG/DL — SIGNIFICANT CHANGE UP
CHOLEST SERPL-MCNC: 157 MG/DL — SIGNIFICANT CHANGE UP
CO2 SERPL-SCNC: 25 MMOL/L — SIGNIFICANT CHANGE UP (ref 17–32)
CO2 SERPL-SCNC: 25 MMOL/L — SIGNIFICANT CHANGE UP (ref 17–32)
CREAT SERPL-MCNC: 1.2 MG/DL — SIGNIFICANT CHANGE UP (ref 0.7–1.5)
CREAT SERPL-MCNC: 1.2 MG/DL — SIGNIFICANT CHANGE UP (ref 0.7–1.5)
EGFR: 72 ML/MIN/1.73M2 — SIGNIFICANT CHANGE UP
EGFR: 72 ML/MIN/1.73M2 — SIGNIFICANT CHANGE UP
ESTIMATED AVERAGE GLUCOSE: 137 MG/DL — HIGH (ref 68–114)
ESTIMATED AVERAGE GLUCOSE: 137 MG/DL — HIGH (ref 68–114)
GLUCOSE SERPL-MCNC: 115 MG/DL — HIGH (ref 70–99)
GLUCOSE SERPL-MCNC: 115 MG/DL — HIGH (ref 70–99)
HCT VFR BLD CALC: 44.2 % — SIGNIFICANT CHANGE UP (ref 42–52)
HCT VFR BLD CALC: 44.2 % — SIGNIFICANT CHANGE UP (ref 42–52)
HDLC SERPL-MCNC: 45 MG/DL — SIGNIFICANT CHANGE UP
HDLC SERPL-MCNC: 45 MG/DL — SIGNIFICANT CHANGE UP
HGB BLD-MCNC: 15.2 G/DL — SIGNIFICANT CHANGE UP (ref 14–18)
HGB BLD-MCNC: 15.2 G/DL — SIGNIFICANT CHANGE UP (ref 14–18)
INR BLD: 1.1 RATIO — SIGNIFICANT CHANGE UP (ref 0.65–1.3)
INR BLD: 1.1 RATIO — SIGNIFICANT CHANGE UP (ref 0.65–1.3)
LIPID PNL WITH DIRECT LDL SERPL: 92 MG/DL — SIGNIFICANT CHANGE UP
LIPID PNL WITH DIRECT LDL SERPL: 92 MG/DL — SIGNIFICANT CHANGE UP
MCHC RBC-ENTMCNC: 29.7 PG — SIGNIFICANT CHANGE UP (ref 27–31)
MCHC RBC-ENTMCNC: 29.7 PG — SIGNIFICANT CHANGE UP (ref 27–31)
MCHC RBC-ENTMCNC: 34.4 G/DL — SIGNIFICANT CHANGE UP (ref 32–37)
MCHC RBC-ENTMCNC: 34.4 G/DL — SIGNIFICANT CHANGE UP (ref 32–37)
MCV RBC AUTO: 86.5 FL — SIGNIFICANT CHANGE UP (ref 80–94)
MCV RBC AUTO: 86.5 FL — SIGNIFICANT CHANGE UP (ref 80–94)
NON HDL CHOLESTEROL: 112 MG/DL — SIGNIFICANT CHANGE UP
NON HDL CHOLESTEROL: 112 MG/DL — SIGNIFICANT CHANGE UP
NRBC # BLD: 0 /100 WBCS — SIGNIFICANT CHANGE UP (ref 0–0)
NRBC # BLD: 0 /100 WBCS — SIGNIFICANT CHANGE UP (ref 0–0)
PLATELET # BLD AUTO: 212 K/UL — SIGNIFICANT CHANGE UP (ref 130–400)
PLATELET # BLD AUTO: 212 K/UL — SIGNIFICANT CHANGE UP (ref 130–400)
PMV BLD: 11.4 FL — HIGH (ref 7.4–10.4)
PMV BLD: 11.4 FL — HIGH (ref 7.4–10.4)
POTASSIUM SERPL-MCNC: 4.1 MMOL/L — SIGNIFICANT CHANGE UP (ref 3.5–5)
POTASSIUM SERPL-MCNC: 4.1 MMOL/L — SIGNIFICANT CHANGE UP (ref 3.5–5)
POTASSIUM SERPL-SCNC: 4.1 MMOL/L — SIGNIFICANT CHANGE UP (ref 3.5–5)
POTASSIUM SERPL-SCNC: 4.1 MMOL/L — SIGNIFICANT CHANGE UP (ref 3.5–5)
PROTHROM AB SERPL-ACNC: 12.6 SEC — SIGNIFICANT CHANGE UP (ref 9.95–12.87)
PROTHROM AB SERPL-ACNC: 12.6 SEC — SIGNIFICANT CHANGE UP (ref 9.95–12.87)
RBC # BLD: 5.11 M/UL — SIGNIFICANT CHANGE UP (ref 4.7–6.1)
RBC # BLD: 5.11 M/UL — SIGNIFICANT CHANGE UP (ref 4.7–6.1)
RBC # FLD: 14.1 % — SIGNIFICANT CHANGE UP (ref 11.5–14.5)
RBC # FLD: 14.1 % — SIGNIFICANT CHANGE UP (ref 11.5–14.5)
SODIUM SERPL-SCNC: 141 MMOL/L — SIGNIFICANT CHANGE UP (ref 135–146)
SODIUM SERPL-SCNC: 141 MMOL/L — SIGNIFICANT CHANGE UP (ref 135–146)
TRIGL SERPL-MCNC: 95 MG/DL — SIGNIFICANT CHANGE UP
TRIGL SERPL-MCNC: 95 MG/DL — SIGNIFICANT CHANGE UP
TROPONIN T SERPL-MCNC: 0.02 NG/ML — HIGH
TROPONIN T SERPL-MCNC: 0.02 NG/ML — HIGH
TSH SERPL-MCNC: 2.1 UIU/ML — SIGNIFICANT CHANGE UP (ref 0.27–4.2)
TSH SERPL-MCNC: 2.1 UIU/ML — SIGNIFICANT CHANGE UP (ref 0.27–4.2)
WBC # BLD: 8.9 K/UL — SIGNIFICANT CHANGE UP (ref 4.8–10.8)
WBC # BLD: 8.9 K/UL — SIGNIFICANT CHANGE UP (ref 4.8–10.8)
WBC # FLD AUTO: 8.9 K/UL — SIGNIFICANT CHANGE UP (ref 4.8–10.8)
WBC # FLD AUTO: 8.9 K/UL — SIGNIFICANT CHANGE UP (ref 4.8–10.8)

## 2023-11-29 PROCEDURE — 92978 ENDOLUMINL IVUS OCT C 1ST: CPT | Mod: 26,LD

## 2023-11-29 PROCEDURE — 99222 1ST HOSP IP/OBS MODERATE 55: CPT

## 2023-11-29 PROCEDURE — 93458 L HRT ARTERY/VENTRICLE ANGIO: CPT | Mod: 26

## 2023-11-29 PROCEDURE — 93306 TTE W/DOPPLER COMPLETE: CPT | Mod: 26

## 2023-11-29 RX ORDER — NITROGLYCERIN 6.5 MG
70 CAPSULE, EXTENDED RELEASE ORAL
Qty: 50 | Refills: 0 | Status: DISCONTINUED | OUTPATIENT
Start: 2023-11-29 | End: 2023-11-29

## 2023-11-29 RX ORDER — AMLODIPINE BESYLATE 2.5 MG/1
10 TABLET ORAL DAILY
Refills: 0 | Status: DISCONTINUED | OUTPATIENT
Start: 2023-11-30 | End: 2023-11-30

## 2023-11-29 RX ORDER — NITROGLYCERIN 6.5 MG
10 CAPSULE, EXTENDED RELEASE ORAL
Qty: 50 | Refills: 0 | Status: DISCONTINUED | OUTPATIENT
Start: 2023-11-29 | End: 2023-11-29

## 2023-11-29 RX ORDER — NITROGLYCERIN 6.5 MG
60 CAPSULE, EXTENDED RELEASE ORAL
Qty: 50 | Refills: 0 | Status: DISCONTINUED | OUTPATIENT
Start: 2023-11-29 | End: 2023-11-30

## 2023-11-29 RX ORDER — AMLODIPINE BESYLATE 2.5 MG/1
5 TABLET ORAL ONCE
Refills: 0 | Status: COMPLETED | OUTPATIENT
Start: 2023-11-29 | End: 2023-11-29

## 2023-11-29 RX ORDER — ATORVASTATIN CALCIUM 80 MG/1
80 TABLET, FILM COATED ORAL AT BEDTIME
Refills: 0 | Status: DISCONTINUED | OUTPATIENT
Start: 2023-11-29 | End: 2023-11-30

## 2023-11-29 RX ORDER — HYDRALAZINE HCL 50 MG
5 TABLET ORAL ONCE
Refills: 0 | Status: COMPLETED | OUTPATIENT
Start: 2023-11-29 | End: 2023-11-29

## 2023-11-29 RX ORDER — SODIUM CHLORIDE 9 MG/ML
1000 INJECTION INTRAMUSCULAR; INTRAVENOUS; SUBCUTANEOUS
Refills: 0 | Status: DISCONTINUED | OUTPATIENT
Start: 2023-11-29 | End: 2023-11-30

## 2023-11-29 RX ORDER — FUROSEMIDE 40 MG
40 TABLET ORAL
Refills: 0 | Status: DISCONTINUED | OUTPATIENT
Start: 2023-11-30 | End: 2023-11-30

## 2023-11-29 RX ADMIN — Medication 81 MILLIGRAM(S): at 11:11

## 2023-11-29 RX ADMIN — Medication 1: at 21:40

## 2023-11-29 RX ADMIN — Medication 5 MILLIGRAM(S): at 16:07

## 2023-11-29 RX ADMIN — ATORVASTATIN CALCIUM 80 MILLIGRAM(S): 80 TABLET, FILM COATED ORAL at 21:41

## 2023-11-29 RX ADMIN — LOSARTAN POTASSIUM 100 MILLIGRAM(S): 100 TABLET, FILM COATED ORAL at 04:18

## 2023-11-29 RX ADMIN — CARVEDILOL PHOSPHATE 12.5 MILLIGRAM(S): 80 CAPSULE, EXTENDED RELEASE ORAL at 04:18

## 2023-11-29 RX ADMIN — SODIUM CHLORIDE 100 MILLILITER(S): 9 INJECTION INTRAMUSCULAR; INTRAVENOUS; SUBCUTANEOUS at 11:11

## 2023-11-29 RX ADMIN — Medication 3 MICROGRAM(S)/MIN: at 16:20

## 2023-11-29 RX ADMIN — AMLODIPINE BESYLATE 5 MILLIGRAM(S): 2.5 TABLET ORAL at 08:01

## 2023-11-29 RX ADMIN — SODIUM CHLORIDE 75 MILLILITER(S): 9 INJECTION INTRAMUSCULAR; INTRAVENOUS; SUBCUTANEOUS at 19:13

## 2023-11-29 RX ADMIN — Medication 18 MICROGRAM(S)/MIN: at 22:39

## 2023-11-29 RX ADMIN — AMLODIPINE BESYLATE 5 MILLIGRAM(S): 2.5 TABLET ORAL at 04:18

## 2023-11-29 RX ADMIN — SODIUM CHLORIDE 100 MILLILITER(S): 9 INJECTION INTRAMUSCULAR; INTRAVENOUS; SUBCUTANEOUS at 00:00

## 2023-11-29 RX ADMIN — CARVEDILOL PHOSPHATE 12.5 MILLIGRAM(S): 80 CAPSULE, EXTENDED RELEASE ORAL at 16:49

## 2023-11-29 NOTE — PROGRESS NOTE ADULT - ASSESSMENT
Assessment	  Patient is a 53 year old male with a 40 pack year smoking history and a pmhx of HTN, HLD, diabetes, and COPD presenting to the emergency department with 6 days of shortness of breath and chest pain with elevated troponin in the setting of hypertensive urgency.     #NSTEMI  trop 0.02 x 3  TTE Pend  Atorvastatin 40mg daily   Cont carvedilol 12.5m BID  EKG nsr  NPO for C today    # Volume overload?  Elevated BNP 1691  -Will give Lasix 40mg IVPx 1 and re-evaluate in the AM    #HTN:  BP on admission 216/101 HTN emergency  monitor BP and consider giving additional med for better blood pressure control  Change atenolol to coreg 12.5mg PO BID, titrate as needed  continue losartan 100mg   amlodipine 5x1 extra this am  Norvasc 5mg PO Daily increased to 10 due to htn    #HLD:  ldl 92  Lipitor 40mg PO QHS started    #DM:  Check Hgb A1C in AM  Start Insulin SS  Hold oral agents    #COPD  Stable    [id number] Assessment	  Patient is a 53 year old male with a 40 pack year smoking history and a pmhx of HTN, HLD, diabetes, and COPD presenting to the emergency department with 6 days of shortness of breath and chest pain with elevated troponin in the setting of hypertensive urgency.     #NSTEMI  trop 0.02 x 3  TTE Pend  Atorvastatin 80mg daily   Cont carvedilol 12.5m BID  EKG nsr  NPO for C today    # Volume overload?  Elevated BNP 1691  -Will give Lasix 40mg IVPx 1 and re-evaluate in the AM    #HTN:  BP on admission 216/101 HTN emergency  monitor BP and consider giving additional med for better blood pressure control  Change atenolol to coreg 12.5mg PO BID, titrate as needed  continue losartan 100mg   amlodipine 5x1 extra this am  Norvasc 5mg PO Daily increased to 10 due to htn    #HLD:  ldl 92  Lipitor 80mg PO QHS started    #DM:  Check Hgb A1C in AM  Start Insulin SS  Hold oral agents    #COPD  Stable    [id number]

## 2023-11-29 NOTE — CHART NOTE - NSCHARTNOTEFT_GEN_A_CORE
PREOPERATIVE DAY OF PROCEDURE EVALUATION:  I have personally seen and examined the patient.  I agree with the history and physical which I have reviewed and noted any changes below.     53 year old male, current smoker (40 pack year smoking history), with PMHx of HTN, HLD, DM-II, COPD, who presented to ED with 6 days of SOB and mild chest discomfort, with elevated troponin (0.02 x 3) in the setting of hypertensive urgency, continued Losartan, switched Atenolol to Coreg, added Amlodipine 10mg, and was diuresed with Lasix 40mg IVx1 yesterday.  Pt is now referred for C with possible intervention if clinically indicated.    Bleeding Risk Score:   1.0 %  IVF pre-hydration: NS @ 100/hr since midnight, hold IVF for now, mildly dyspneic, pt is in hypertensive urgency with BP of 217/112, suspected to be overloaded  -Hydralazine 10mg IV x 1 now  -start NTG gtt  -Elevated BNP 1691 on admission  -received Lasix 40mg IV x 1 yesterday  -TTE 11/29/23: EF 60-65%, GIIDD  -received ASA 324mg po x 1 yesterday, and 81mg po x 1 today  -BMP pending (Cr 1.5 yesterday)    Right Morocho: positive       (Signed electronically by __________)  11-29-23 @ 16:32 PREOPERATIVE DAY OF PROCEDURE EVALUATION:  I have personally seen and examined the patient.  I agree with the history and physical which I have reviewed and noted any changes below.     53 year old male, current smoker (40 pack year smoking history), with PMHx of HTN, HLD, DM-II, COPD, who presented to ED with 6 days of SOB and mild chest discomfort, with elevated troponin (0.02 x 3) in the setting of hypertensive urgency, continued Losartan, switched Atenolol to Coreg, added Amlodipine 10mg, and was diuresed with Lasix 40mg IVx1 yesterday.  Pt is now referred for C with possible intervention if clinically indicated.    Bleeding Risk Score:   1.0 %  IVF pre-hydration: NS @ 100/hr since midnight, hold IVF for now, mildly dyspneic, pt is in hypertensive urgency with BP of 217/112, suspected to be overloaded  -Hydralazine 10mg IV x 1 now  -start NTG gtt  -Elevated BNP 1691 on admission  -received Lasix 40mg IV x 1 yesterday  -TTE 11/29/23: EF 60-65%, GIIDD  -received ASA 324mg po x 1 yesterday, and 81mg po x 1 today  -Cr 1.5 yesterday, rept Cr 1.2    Right Rashawn: positive       (Signed electronically by __________)  11-29-23 @ 16:32 PREOPERATIVE DAY OF PROCEDURE EVALUATION:  I have personally seen and examined the patient.  I agree with the history and physical which I have reviewed and noted any changes below.     53 year old male, current smoker (40 pack year smoking history), with PMHx of HTN, HLD, DM-II, COPD, non-obstructive CAD by diagnostic cath in 2018, who presented to ED with 6 days of SOB and mild chest discomfort, with elevated troponin (0.02 x 3) in the setting of hypertensive urgency, continued Losartan, switched Atenolol to Coreg, added Amlodipine 10mg, and was diuresed with Lasix 40mg IVx1 yesterday.  Pt is now referred for LHC with possible intervention if clinically indicated.    Bleeding Risk Score:   1.0 %  IVF pre-hydration: NS @ 100/hr since midnight, hold IVF for now, mildly dyspneic, pt is in hypertensive urgency with BP of 217/112, suspected to be overloaded  -Hydralazine 10mg IV x 1 now  -start NTG gtt  -Elevated BNP 1691 on admission  -received Lasix 40mg IV x 1 yesterday  -TTE 11/29/23: EF 60-65%, GIIDD  -received ASA 324mg po x 1 yesterday, and 81mg po x 1 today  -Cr 1.5 yesterday, rept Cr 1.2    Right Morocho: positive       (Signed electronically by __________)  11-29-23 @ 16:32

## 2023-11-29 NOTE — CHART NOTE - NSCHARTNOTEFT_GEN_A_CORE
Interventional Cardiology Radial band Removal Note    s/p Heparin 			    Pt without complaints.  VSS.    Pre removal exam  Right Radial access site D-Stat in place, No hematoma, No bleed  Radial pulse:    D-stat   2 clicks removed at 930pm   2 clicks  removed at _935pm  2clicks removed at 940pm  remain clicks removed at 945pmpm     Hemostasis achieved with manual release of band.      NO Vasovagal reaction.    Meds given: No med given     Post removal exam  Right Radial access site No hematoma, NO bleed  Radial pulse: 2+ palpable pulses     A/P:  s/p LHC revealing moderate LM and RCA disease   -	continue to monitor  -	-OOB as tolerated  -	Post Procedure Instructions given  -                   Call [phone] if any access site issues.

## 2023-11-29 NOTE — CHART NOTE - NSCHARTNOTEFT_GEN_A_CORE
PRE-OP DIAGNOSIS:    NSTEMI    PROCEDURE:   [x] Coronary Angiogram   [x] [facility]   [x] Intervention IVUS of LM       PHYSICIAN:    Dr. Christensen    ASSISTANT:    Dr. Albarran      PROCEDURE DESCRIPTION:     Consent:      [x] Patient     [] Family Member     []  Used        Anesthesia:     [] General     [x] Sedation   [x] Local        Access & Closure:     [x]6 Fr right Radial Artery > D-stat        IV Contrast: mL        Intervention:   IVUS of the LM, MLA 12.7 mm2    Implants:   None     FINDINGS:     Coronary Dominance:   Right    LM:   distal 50% lesion not significant by IVUS    LAD:   mild disease     CX:   mild disease     Ramus:   mild disease     RCA:   proximal mild disease   mid 60% stenosis   distal mild disease     RPDA   mild disease      LVEDP: 40mmHg     EF: 65%        ESTIMATED BLOOD LOSS: < 10 mL        CONDITION:     [x] Good     [] Fair     [] Critical        SPECIMEN REMOVED: N/A       POST-OP DIAGNOSIS:    Moderate LM disease with MLA area of 12.7 mm2  Moderate mid RCA disease      PLAN OF CARE:  [x] Return to In-patient bed in telemetry  [x] Medications:   cont with aspirin and Lipitor  BP control with nitro gtt  cont with coreg and ACE inhibitors   [x] Post-procedure LVEDP-guided IV fluids as ordered   [x] Encourage oral fluids daily

## 2023-11-29 NOTE — PROGRESS NOTE ADULT - SUBJECTIVE AND OBJECTIVE BOX
Chief complaint: Patient is a 53y old  Male who presents with a chief complaint of shortness of breath (28 Nov 2023 14:14)    Interval history: Patient seen and examined at bedside. No overnight events noted. NPO for [facility] today.     Review of systems: A complete 10-point review of systems was obtained and is negative except as stated in the interval history.    Vitals:  T(F): 98.5, Max: 98.5 (11-28 @ 13:25)  HR: 63 (59 - 80)  BP: 167/99 (164/88 - 216/101)  RR: 20 (18 - 20)  SpO2: 96% (95% - 99%)    Ins & outs:     11-28 @ 07:01  -  11-29 @ 07:00  --------------------------------------------------------  IN: 870 mL / OUT: 700 mL / NET: 170 mL      Weight trend:  Weight (kg): 121.7 (11-28)    Physical exam:  General: No apparent distress  HEENT: Anicteric sclera. Moist mucous membranes. JVP *** cm.   Cardiac: Regular rate and rhythm. No murmurs, rubs, or gallops.   Vascular: Symmetric radial pulses. Dorsalis pedis pulses palpable.   Respiratory: Normal effort. Clear to ascultation.   Abdomen: Soft, nontender. Audible bowel sounds.   Extremities: Warm with noedema. No cyanosis or clubbing.   Skin: Warm and dry. No rash.   Neurologic: Grossly normal motor function.   Psychiatric: Oriented to person, place, and time.     Data reviewed:  - Telemetry: SR Hr 62  - ECG < from: 12 Lead ECG (11.28.23 @ 12:26) >  Diagnosis Line Normal sinus rhythm  Possible Left atrial enlargement  Left ventricular hypertrophy ( Sokolow-Llanos , Romhilt-Maldonado )  ST & Marked T wave abnormality, consider inferior ischemia  Abnormal ECG    < end of copied text >     - TTE- pend   - Cardiac catheterization:  FINDINGS      LEFT HEART CATHETERIZATION       LVEF- 60 %                                 Left main- 30 % stenosis in distal LM     LAD:  normal                       Diag: normal     Left Circumflex: normal   OM: normal     Right Coronary Artery: 40 % stenosis in mid RCA  RPDA: normal  RPL: normal     Ramus Intermed: normal     INTERVENTION  IMPLANTS: none       POST-OP DIAGNOSIS    Non obstructive CAD  No recent Cardiac MRI, Stress test or CCTA.     - Labs:                        16.8   10.80 )-----------( 234      ( 28 Nov 2023 12:50 )             49.4     11-28    142  |  104  |  25<H>  ----------------------------<  118<H>  4.8   |  26  |  1.5    Ca    9.8      28 Nov 2023 12:50  Mg     2.3     11-28    TPro  6.8  /  Alb  4.2  /  TBili  0.4  /  DBili  x   /  AST  23  /  ALT  31  /  AlkPhos  113  11-28    PT/INR - ( 29 Nov 2023 05:24 )   PT: 12.60 sec;   INR: 1.10 ratio         PTT - ( 29 Nov 2023 05:24 )  PTT:30.6 sec  Troponin T, Serum: 0.02 ng/mL (11-29-23 @ 05:24)  Troponin T, Serum: 0.02 ng/mL (11-28-23 @ 17:44)  Troponin T, Serum: 0.02 ng/mL (11-28-23 @ 12:50)        Triglycerides, Serum: 95 mg/dL (11-29-23 @ 05:24)  LDL Cholesterol Calculated: 92 mg/dL (11-29-23 @ 05:24)      Urinalysis Basic - ( 28 Nov 2023 12:50 )    Color: x / Appearance: x / SG: x / pH: x  Gluc: 118 mg/dL / Ketone: x  / Bili: x / Urobili: x   Blood: x / Protein: x / Nitrite: x   Leuk Esterase: x / RBC: x / WBC x   Sq Epi: x / Non Sq Epi: x / Bacteria: x        Medications:  aspirin enteric coated 81 milliGRAM(s) Oral daily  atorvastatin 40 milliGRAM(s) Oral at bedtime  carvedilol 12.5 milliGRAM(s) Oral every 12 hours  dextrose 50% Injectable 25 Gram(s) IV Push once  dextrose 50% Injectable 25 Gram(s) IV Push once  dextrose 50% Injectable 12.5 Gram(s) IV Push once  glucagon  Injectable 1 milliGRAM(s) IntraMuscular once  insulin lispro (ADMELOG) corrective regimen sliding scale   SubCutaneous Before meals and at bedtime  losartan 100 milliGRAM(s) Oral daily    Drips:  dextrose 5%. 1000 milliLiter(s) (50 mL/Hr) IV Continuous <Continuous>  dextrose 5%. 1000 milliLiter(s) (100 mL/Hr) IV Continuous <Continuous>  sodium chloride 0.9%. 1000 milliLiter(s) (100 mL/Hr) IV Continuous <Continuous>    PRN:     Allergies    No Known Allergies    Intolerances

## 2023-11-30 ENCOUNTER — TRANSCRIPTION ENCOUNTER (OUTPATIENT)
Age: 53
End: 2023-11-30

## 2023-11-30 VITALS
DIASTOLIC BLOOD PRESSURE: 79 MMHG | SYSTOLIC BLOOD PRESSURE: 177 MMHG | TEMPERATURE: 99 F | OXYGEN SATURATION: 94 % | RESPIRATION RATE: 20 BRPM | HEART RATE: 92 BPM

## 2023-11-30 LAB
ANION GAP SERPL CALC-SCNC: 15 MMOL/L — HIGH (ref 7–14)
ANION GAP SERPL CALC-SCNC: 15 MMOL/L — HIGH (ref 7–14)
BUN SERPL-MCNC: 29 MG/DL — HIGH (ref 10–20)
BUN SERPL-MCNC: 29 MG/DL — HIGH (ref 10–20)
CALCIUM SERPL-MCNC: 9.5 MG/DL — SIGNIFICANT CHANGE UP (ref 8.4–10.4)
CALCIUM SERPL-MCNC: 9.5 MG/DL — SIGNIFICANT CHANGE UP (ref 8.4–10.4)
CHLORIDE SERPL-SCNC: 103 MMOL/L — SIGNIFICANT CHANGE UP (ref 98–110)
CHLORIDE SERPL-SCNC: 103 MMOL/L — SIGNIFICANT CHANGE UP (ref 98–110)
CO2 SERPL-SCNC: 23 MMOL/L — SIGNIFICANT CHANGE UP (ref 17–32)
CO2 SERPL-SCNC: 23 MMOL/L — SIGNIFICANT CHANGE UP (ref 17–32)
CREAT SERPL-MCNC: 1.6 MG/DL — HIGH (ref 0.7–1.5)
CREAT SERPL-MCNC: 1.6 MG/DL — HIGH (ref 0.7–1.5)
EGFR: 51 ML/MIN/1.73M2 — LOW
EGFR: 51 ML/MIN/1.73M2 — LOW
GLUCOSE SERPL-MCNC: 127 MG/DL — HIGH (ref 70–99)
GLUCOSE SERPL-MCNC: 127 MG/DL — HIGH (ref 70–99)
HCT VFR BLD CALC: 45.7 % — SIGNIFICANT CHANGE UP (ref 42–52)
HCT VFR BLD CALC: 45.7 % — SIGNIFICANT CHANGE UP (ref 42–52)
HGB BLD-MCNC: 15.3 G/DL — SIGNIFICANT CHANGE UP (ref 14–18)
HGB BLD-MCNC: 15.3 G/DL — SIGNIFICANT CHANGE UP (ref 14–18)
MAGNESIUM SERPL-MCNC: 2 MG/DL — SIGNIFICANT CHANGE UP (ref 1.8–2.4)
MAGNESIUM SERPL-MCNC: 2 MG/DL — SIGNIFICANT CHANGE UP (ref 1.8–2.4)
MCHC RBC-ENTMCNC: 29.3 PG — SIGNIFICANT CHANGE UP (ref 27–31)
MCHC RBC-ENTMCNC: 29.3 PG — SIGNIFICANT CHANGE UP (ref 27–31)
MCHC RBC-ENTMCNC: 33.5 G/DL — SIGNIFICANT CHANGE UP (ref 32–37)
MCHC RBC-ENTMCNC: 33.5 G/DL — SIGNIFICANT CHANGE UP (ref 32–37)
MCV RBC AUTO: 87.4 FL — SIGNIFICANT CHANGE UP (ref 80–94)
MCV RBC AUTO: 87.4 FL — SIGNIFICANT CHANGE UP (ref 80–94)
NRBC # BLD: 0 /100 WBCS — SIGNIFICANT CHANGE UP (ref 0–0)
NRBC # BLD: 0 /100 WBCS — SIGNIFICANT CHANGE UP (ref 0–0)
PLATELET # BLD AUTO: 236 K/UL — SIGNIFICANT CHANGE UP (ref 130–400)
PLATELET # BLD AUTO: 236 K/UL — SIGNIFICANT CHANGE UP (ref 130–400)
PMV BLD: 12.7 FL — HIGH (ref 7.4–10.4)
PMV BLD: 12.7 FL — HIGH (ref 7.4–10.4)
POTASSIUM SERPL-MCNC: 4 MMOL/L — SIGNIFICANT CHANGE UP (ref 3.5–5)
POTASSIUM SERPL-MCNC: 4 MMOL/L — SIGNIFICANT CHANGE UP (ref 3.5–5)
POTASSIUM SERPL-SCNC: 4 MMOL/L — SIGNIFICANT CHANGE UP (ref 3.5–5)
POTASSIUM SERPL-SCNC: 4 MMOL/L — SIGNIFICANT CHANGE UP (ref 3.5–5)
RBC # BLD: 5.23 M/UL — SIGNIFICANT CHANGE UP (ref 4.7–6.1)
RBC # BLD: 5.23 M/UL — SIGNIFICANT CHANGE UP (ref 4.7–6.1)
RBC # FLD: 14.1 % — SIGNIFICANT CHANGE UP (ref 11.5–14.5)
RBC # FLD: 14.1 % — SIGNIFICANT CHANGE UP (ref 11.5–14.5)
SODIUM SERPL-SCNC: 141 MMOL/L — SIGNIFICANT CHANGE UP (ref 135–146)
SODIUM SERPL-SCNC: 141 MMOL/L — SIGNIFICANT CHANGE UP (ref 135–146)
WBC # BLD: 12.41 K/UL — HIGH (ref 4.8–10.8)
WBC # BLD: 12.41 K/UL — HIGH (ref 4.8–10.8)
WBC # FLD AUTO: 12.41 K/UL — HIGH (ref 4.8–10.8)
WBC # FLD AUTO: 12.41 K/UL — HIGH (ref 4.8–10.8)

## 2023-11-30 PROCEDURE — 99239 HOSP IP/OBS DSCHRG MGMT >30: CPT

## 2023-11-30 PROCEDURE — 93010 ELECTROCARDIOGRAM REPORT: CPT

## 2023-11-30 RX ORDER — ATORVASTATIN CALCIUM 80 MG/1
1 TABLET, FILM COATED ORAL
Qty: 30 | Refills: 0
Start: 2023-11-30 | End: 2023-12-29

## 2023-11-30 RX ORDER — CARVEDILOL PHOSPHATE 80 MG/1
3 CAPSULE, EXTENDED RELEASE ORAL
Qty: 180 | Refills: 0
Start: 2023-11-30 | End: 2023-12-29

## 2023-11-30 RX ORDER — LOSARTAN POTASSIUM 100 MG/1
1 TABLET, FILM COATED ORAL
Refills: 0 | DISCHARGE

## 2023-11-30 RX ORDER — LOSARTAN POTASSIUM 100 MG/1
1 TABLET, FILM COATED ORAL
Qty: 30 | Refills: 0
Start: 2023-11-30 | End: 2023-12-29

## 2023-11-30 RX ORDER — CARVEDILOL PHOSPHATE 80 MG/1
6.25 CAPSULE, EXTENDED RELEASE ORAL ONCE
Refills: 0 | Status: COMPLETED | OUTPATIENT
Start: 2023-11-30 | End: 2023-11-30

## 2023-11-30 RX ORDER — METFORMIN HYDROCHLORIDE 850 MG/1
1 TABLET ORAL
Qty: 0 | Refills: 0 | DISCHARGE

## 2023-11-30 RX ORDER — CARVEDILOL PHOSPHATE 80 MG/1
18.75 CAPSULE, EXTENDED RELEASE ORAL EVERY 12 HOURS
Refills: 0 | Status: DISCONTINUED | OUTPATIENT
Start: 2023-11-30 | End: 2023-11-30

## 2023-11-30 RX ORDER — ASPIRIN/CALCIUM CARB/MAGNESIUM 324 MG
1 TABLET ORAL
Qty: 0 | Refills: 0 | DISCHARGE

## 2023-11-30 RX ORDER — FUROSEMIDE 40 MG
1 TABLET ORAL
Qty: 60 | Refills: 0
Start: 2023-11-30 | End: 2024-01-28

## 2023-11-30 RX ORDER — AMLODIPINE BESYLATE 2.5 MG/1
1 TABLET ORAL
Qty: 30 | Refills: 0
Start: 2023-11-30 | End: 2023-12-29

## 2023-11-30 RX ORDER — AMLODIPINE BESYLATE 2.5 MG/1
1 TABLET ORAL
Qty: 0 | Refills: 0 | DISCHARGE

## 2023-11-30 RX ORDER — ASPIRIN/CALCIUM CARB/MAGNESIUM 324 MG
1 TABLET ORAL
Qty: 0 | Refills: 0 | DISCHARGE
Start: 2023-11-30

## 2023-11-30 RX ORDER — FUROSEMIDE 40 MG
40 TABLET ORAL DAILY
Refills: 0 | Status: CANCELLED | OUTPATIENT
Start: 2023-12-01 | End: 2023-11-30

## 2023-11-30 RX ADMIN — Medication 81 MILLIGRAM(S): at 10:20

## 2023-11-30 RX ADMIN — AMLODIPINE BESYLATE 10 MILLIGRAM(S): 2.5 TABLET ORAL at 05:41

## 2023-11-30 RX ADMIN — Medication 1: at 07:42

## 2023-11-30 RX ADMIN — CARVEDILOL PHOSPHATE 12.5 MILLIGRAM(S): 80 CAPSULE, EXTENDED RELEASE ORAL at 05:41

## 2023-11-30 RX ADMIN — Medication 40 MILLIGRAM(S): at 05:41

## 2023-11-30 RX ADMIN — CARVEDILOL PHOSPHATE 6.25 MILLIGRAM(S): 80 CAPSULE, EXTENDED RELEASE ORAL at 10:20

## 2023-11-30 RX ADMIN — LOSARTAN POTASSIUM 100 MILLIGRAM(S): 100 TABLET, FILM COATED ORAL at 05:43

## 2023-11-30 NOTE — DISCHARGE NOTE NURSING/CASE MANAGEMENT/SOCIAL WORK - NSDCPEFALRISK_GEN_ALL_CORE
For information on Fall & Injury Prevention, visit: [email or web address] OR  [email or web address] OR  https://www.cdc.gov/steadi/patient.html

## 2023-11-30 NOTE — DISCHARGE NOTE PROVIDER - HOSPITAL COURSE
Patient is a 53 year old male with a 40 pack year smoking history and a pmhx of HTN, HLD, diabetes, and COPD presenting to the emergency department with 6 days of shortness of breath and chest pain with elevated troponin (0.02 x3) in the setting of hypertensive urgency. Pt admitted for NSTEMI and plan for cardiac catheterization.     On 11/29/23 patient underwent LHC which revealed: Moderate LM disease with MLA area of 12.7 mm2, moderate mid RCA disease. Patient was monitored overnight. Post-cath patient was hypertensive SBP in 200s. Pt was given Hydralazine IVP 10 mg x1 and was started on a nitroglycerin gtt. Patient On POD 1 patient remains HD stable with no complaints. Patient remains in SR with no arrhythmias noted on tele. EKG performed showed no acute ST changes. Examination of right radial artery access site showed a C/D/I site with no hematoma, erythema or bruit. Distal pulses are 2+ bilaterally. Renal function remains stable post cath. Patient will be discharged home on ASA. Patient is being DC home in stable condition.    LEFT HEART CATHETERIZATION FINDINGS 11/29/23:   Intervention:   IVUS of the LM, MLA 12.7 mm2    FINDINGS:   Coronary Dominance:   Right    LM:   distal 50% lesion not significant by IVUS    LAD:   mild disease     CX:   mild disease     Ramus:   mild disease     RCA:   proximal mild disease   mid 60% stenosis   distal mild disease     RPDA   mild disease      LVEDP: 40mmHg     EF: 65%    Patient is a 53 year old male with a 40 pack year smoking history and a pmhx of HTN, HLD, diabetes, and COPD presenting to the emergency department with 6 days of shortness of breath and chest pain with elevated troponin (0.02 x3) in the setting of hypertensive urgency. Pt admitted for NSTEMI and plan for cardiac catheterization.     On 11/29/23 patient underwent LHC which revealed: Moderate LM disease with MLA area of 12.7 mm2, moderate mid RCA disease. Patient was monitored overnight. Post-cath patient was hypertensive SBP in 200s. Pt was given Hydralazine IVP 10 mg x1 and was started on a nitroglycerin gtt. On patient's LHC he was noted to have a LVEDP of 40 mmHg in which he was started on IV Lasix 40mg BID. TTE showed EF 60-65%, Grade IIDD, mild MR, mild TR, and IVC dilated and  >50%. Patient remains in SR with no arrhythmias noted on tele. EKG performed showed no acute ST changes. Examination of right radial artery access site showed a C/D/I site with no hematoma, erythema or bruit. Distal pulses are 2+ bilaterally. POD1 patient still with elevated BP, ACP was called to patient's bedside as pt stating "I want to get out  of here now and disconnect me from this medication". Upon further questioning, patient does not want to change in the hospital.       LEFT HEART CATHETERIZATION FINDINGS 11/29/23:   Intervention:   IVUS of the LM, MLA 12.7 mm2    FINDINGS:   Coronary Dominance:   Right    LM:   distal 50% lesion not significant by IVUS    LAD:   mild disease     CX:   mild disease     Ramus:   mild disease     RCA:   proximal mild disease   mid 60% stenosis   distal mild disease     RPDA   mild disease      LVEDP: 40mmHg     EF: 65%    Patient is a 53 year old male with a 40 pack year smoking history and a pmhx of HTN, HLD, diabetes, and COPD presenting to the emergency department with 6 days of shortness of breath and chest pain with elevated troponin (0.02 x3) in the setting of hypertensive urgency. Pt admitted for NSTEMI and plan for cardiac catheterization.     On 11/29/23 patient underwent LHC which revealed: Moderate LM disease with MLA area of 12.7 mm2, moderate mid RCA disease. Patient was monitored overnight. Post-cath patient was hypertensive SBP in 200s. Pt was given Hydralazine IVP 10 mg x1 and was started on a nitroglycerin gtt. On patient's LHC he was noted to have a LVEDP of 40 mmHg in which he was started on IV Lasix 40mg BID. TTE showed EF 60-65%, Grade IIDD, mild MR, mild TR, and IVC dilated and  >50%. Patient remains in SR with no arrhythmias noted on tele. EKG performed showed no acute ST changes. Examination of right radial artery access site showed a C/D/I site with no hematoma, erythema or bruit. Distal pulses are 2+ bilaterally. POD1 patient still with elevated BP, ACP was called to patient's bedside as pt stating "I want to get out  of here now and disconnect me from this medication". Upon further questioning, patient does not want to stay in the hospital due to his anxiety and when offered anti-anxiety medication pt refused. Despite education regarding his clinical situation and the risks/consequences leaving AMA. Pt still adamantly refusing to stay in the hospital for ongoing treatment. Dr. Majano at the bedside with patient, explained the circumstances regarding his health condition. On discharge, creatinine 1.6 from 1.2 post-cath. Patient instructed to repeat BMP in 1 week post-discharge and to have fax results sent to Dr. Majano's office. Patient is to follow-up with Dr. Majano in his office in 2 weeks. Patient understands the risks of leaving AMA and pt agrees.     LEFT HEART CATHETERIZATION FINDINGS 11/29/23:   Intervention:   IVUS of the LM, MLA 12.7 mm2    FINDINGS:   Coronary Dominance:   Right    LM:   distal 50% lesion not significant by IVUS    LAD:   mild disease     CX:   mild disease     Ramus:   mild disease     RCA:   proximal mild disease   mid 60% stenosis   distal mild disease     RPDA   mild disease      LVEDP: 40mmHg     EF: 65%    Patient is a 53 year old male with a 40 pack year smoking history and a pmhx of HTN, HLD, diabetes, and COPD presenting to the emergency department with 6 days of shortness of breath and chest pain with elevated troponin (0.02 x3) in the setting of hypertensive urgency. Pt admitted for NSTEMI and plan for cardiac catheterization.     On 11/29/23 patient underwent LHC which revealed: Moderate LM disease with MLA area of 12.7 mm2, moderate mid RCA disease. Patient was monitored overnight. Post-cath patient was hypertensive SBP in 200s. Pt was given Hydralazine IVP 10 mg x1 and was started on a nitroglycerin gtt. On patient's LHC he was noted to have a LVEDP of 40 mmHg in which he was started on IV Lasix 40mg BID. TTE showed EF 60-65%, Grade IIDD, mild MR, mild TR, and IVC dilated and  >50%. Patient remains in SR with no arrhythmias noted on tele. EKG performed showed no acute ST changes. Examination of right radial artery access site showed a C/D/I site with no hematoma, erythema or bruit. Distal pulses are 2+ bilaterally. POD1 patient still with elevated BP.     NP was called to patient's bedside as pt stating "I want to get out of here now and disconnect me from this medication".   Upon further questioning, patient does not want to stay in the hospital due to his anxiety and when offered anti-anxiety medication pt refused.   Despite education regarding his clinical situation and the risks/consequences leaving AMA. Pt still adamantly refusing to stay in the hospital for ongoing treatment.   Dr. Majano at the bedside with patient, explained the circumstances regarding his health condition.   Post-procedure, creatinine 1.6 from 1.2 after LHC. Patient was instructed to repeat BMP in 1 week and to have fax results sent to Dr. Majano's office.   Patient is to follow-up with Dr. Majano in his office in 2 weeks.     Patient verbalized understanding of the risks and consequences of leaving AMA.      Patient is a 53 year old male with a 40 pack year smoking history and a pmhx of HTN, HLD, diabetes, and COPD presenting to the emergency department with 6 days of shortness of breath and chest pain with elevated troponin (0.02 x3) in the setting of hypertensive urgency. Pt admitted for NSTEMI and plan for cardiac catheterization.     On 11/29/23 patient underwent LHC which revealed: Moderate LM disease with MLA area of 12.7 mm2, moderate mid RCA disease. Patient was monitored overnight. Post-cath patient was hypertensive SBP in 200s. Pt was given Hydralazine IVP 10 mg x1 and was started on a nitroglycerin gtt. On patient's LHC he was noted to have a LVEDP of 40 mmHg in which he was started on IV Lasix 40mg BID. TTE showed EF 60-65%, Grade IIDD, mild MR, mild TR, and IVC dilated and  >50%. Patient remains in SR with no arrhythmias noted on tele. EKG performed showed no acute ST changes. Examination of right radial artery access site showed a C/D/I site with no hematoma, erythema or bruit. Distal pulses are 2+ bilaterally. POD1 patient still with elevated BP.     NP was called to patient's bedside as pt stating "I want to get out of here now and disconnect me from this medication".   Upon further questioning, patient does not want to stay in the hospital due to his anxiety and when offered anti-anxiety medication pt refused.   Despite education regarding his clinical situation and the risks/consequences leaving AMA. Pt still adamantly refusing to stay in the hospital for ongoing treatment.   Dr. Majano at the bedside with patient, explained the circumstances regarding his multiple health conditions and that pt is still actively being treated for fluid overload and hypertensive urgency.   Post-procedure, creatinine 1.6 from 1.2 after LHC. Patient was instructed to repeat BMP in 1 week and to have fax results sent to Dr. Majano's office.   Patient is to follow-up with Dr. Majano in his office in 2 weeks.     Patient verbalized understanding of the risks and consequences of leaving AMA.      Patient is a 53 year old male with a 40 pack year smoking history and a pmhx of HTN, HLD, diabetes, and COPD presenting to the emergency department with 6 days of shortness of breath and chest pain with elevated troponin (0.02 x3) in the setting of hypertensive urgency. Pt admitted for NSTEMI and plan for cardiac catheterization.     On 11/29/23 patient underwent LHC which revealed: Moderate LM disease with MLA area of 12.7 mm2, moderate mid RCA disease. Patient was monitored overnight. Post-cath patient was hypertensive SBP in 200s. Pt was given Hydralazine IVP 10 mg x1 and was started on a nitroglycerin gtt. On patient's LHC he was noted to have a LVEDP of 40 mmHg in which he was started on IV Lasix 40mg BID. TTE showed EF 60-65%, Grade IIDD, mild MR, mild TR, and IVC dilated and  >50%. Patient remains in SR with no arrhythmias noted on tele. EKG performed showed no acute ST changes. Examination of right radial artery access site showed a C/D/I site with no hematoma, erythema or bruit. Distal pulses are 2+ bilaterally. POD1 patient still with elevated BP.     NP was called to patient's bedside as pt stating "I want to get out of here now and disconnect me from this medication".   Upon further questioning, patient does not want to stay in the hospital due to his anxiety and when offered anti-anxiety medication pt refused.   Despite education regarding his clinical situation and the risks/consequences leaving AMA (including unsafe medical status, risk of worsening condition and even death). Pt still adamantly refusing to stay in the hospital for ongoing treatment.   Dr. Majano at the bedside with patient, explained the circumstances regarding his multiple health conditions and that pt is still actively being treated for fluid overload and hypertensive urgency.   Post-procedure, creatinine 1.6 from 1.2 after LHC. Patient was instructed to repeat BMP in 1 week and to have fax results sent to Dr. Majano's office.  Medications for outpatient regimen were prescribed including amlodipine, lasix, losartan, coreg and atorvastatin.  Patient is to follow-up with Dr. Majano in his office in 2 weeks.     Patient verbalized understanding of the risks and consequences of leaving AMA.

## 2023-11-30 NOTE — DISCHARGE NOTE PROVIDER - NSDCMRMEDTOKEN_GEN_ALL_CORE_FT
Albuterol (Eqv-Ventolin HFA) 90 mcg/inh inhalation aerosol: 2 puff(s) inhaled every 8 hours  amLODIPine 5 mg oral tablet: 1 tab(s) orally once a day  Aspir 81 oral delayed release tablet: 1 tab(s) orally once a day  atorvastatin 40 mg oral tablet: 1 tab(s) orally once a day (at bedtime) MDD:1  losartan 100 mg oral tablet: 1 tab(s) orally once a day  metFORMIN 1000 mg oral tablet: 1 tab(s) orally 2 times a day   Albuterol (Eqv-Ventolin HFA) 90 mcg/inh inhalation aerosol: 2 puff(s) inhaled every 8 hours  amLODIPine 10 mg oral tablet: 1 tab(s) orally once a day  aspirin 81 mg oral delayed release tablet: 1 tab(s) orally once a day  atorvastatin 80 mg oral tablet: 1 tab(s) orally once a day (at bedtime)  Coreg 6.25 mg oral tablet: 3 tab(s) orally every 12 hours  furosemide 40 mg oral tablet: 1 tab(s) orally once a day please take an extra tablet in the evening if you gain 2 or more lbs in 1 day. experiencing shortness of breath, and/or lower extremity swelling  losartan 100 mg oral tablet: 1 tab(s) orally once a day  metFORMIN 1000 mg oral tablet: 1 tab(s) orally 2 times a day PLEASE RESUME SATURDAY 12/2/23 IN THE MORNING

## 2023-11-30 NOTE — DISCHARGE NOTE PROVIDER - CARE PROVIDER_API CALL
Ishaan Majano  Cardiovascular Disease  [address] [place] [id number]  Phone: [phone]  Fax: [phone]  Follow Up Time: 2 weeks

## 2023-11-30 NOTE — DISCHARGE NOTE PROVIDER - NSDCCPCAREPLAN_GEN_ALL_CORE_FT
PRINCIPAL DISCHARGE DIAGNOSIS  Diagnosis: Chest pain  Assessment and Plan of Treatment:       SECONDARY DISCHARGE DIAGNOSES  Diagnosis: NSTEMI (non-ST elevation myocardial infarction)  Assessment and Plan of Treatment:     Diagnosis: Hypertensive urgency  Assessment and Plan of Treatment:

## 2023-11-30 NOTE — DISCHARGE NOTE NURSING/CASE MANAGEMENT/SOCIAL WORK - PATIENT PORTAL LINK FT
You can access the FollowMyHealth Patient Portal offered by [facility] by registering at the following website: [email or web address]. By joining [employer]’s FollowMyHealth portal, you will also be able to view your health information using other applications (apps) compatible with our system.

## 2023-11-30 NOTE — DISCHARGE NOTE PROVIDER - NSDCCPTREATMENT_GEN_ALL_CORE_FT
PRINCIPAL PROCEDURE  Procedure: Left heart catheterization  Findings and Treatment: - Please take aspirin 81 mg daily, unless directed by your Cardiologist.  - Please do not take metformin. You can restart this medication on *** in the morning/evening.   - Do not drive or operate heavy machinery for 24 hours.  - After 24 hours, you may shower and remove the dressing from the site.  - Avoid using affected arm for 24 hours.  - No heavy lifting (objects more than 5 lbs) for 1 week.  - Do not bathe or swim for 1 week.   - Do not rub or apply lotion, cream, or powder to the affected site. Leave it open to the air.   - Any sudden swelling, redness, fever, discharge, or severe pain, call your Cardiologist or call the Catheterization Lab at [phone].  - If there is bleeding from the puncture site, apply direct firm pressure on the site and call [phone].    
[de-identified] : Right thumb with mild swelling, skin intact. +ttp at MCP, unable to stress. Able to flex and extend at IP. Sensation intact throughout. <2sec cap refill. \par \par Right thumb radiographs with no fracture nor dislocation.

## 2023-11-30 NOTE — CHART NOTE - NSCHARTNOTEFT_GEN_A_CORE
NP was called to patient's bedside as pt stating "I want to get out of here now and disconnect me from this medication".   Upon further questioning, patient does not want to stay in the hospital due to his anxiety and when offered anti-anxiety medication pt refused.   Despite education regarding his clinical situation and the risks/consequences leaving AMA. Pt still adamantly refusing to stay in the hospital for ongoing treatment.   Dr. Majano at the bedside with patient, explained the circumstances regarding his health condition.   Post-procedure, creatinine 1.6 from 1.2 after LHC. Patient was instructed to repeat BMP in 1 week and to have fax results sent to Dr. Majano's office.   Patient is to follow-up with Dr. Majano in his office in 2 weeks.     Patient verbalized understanding of the risks and consequences of leaving AMA. NP was called to patient's bedside as pt stating "I want to get out of here now and disconnect me from this medication".   Upon further questioning, patient does not want to stay in the hospital due to his anxiety and when offered anti-anxiety medication pt refused.   Despite education regarding his clinical situation and the risks/consequences leaving AMA. Pt still adamantly refusing to stay in the hospital for ongoing treatment.   Dr. Majano at the bedside with patient, explained the circumstances regarding his multiple health conditions and that pt is still actively being treated for fluid overload and hypertensive urgency.   Post-procedure, creatinine 1.6 from 1.2 after LHC. Patient was instructed to repeat BMP in 1 week and to have fax results sent to Dr. Majano's office.   Patient is to follow-up with Dr. Majano in his office in 2 weeks.     Patient verbalized understanding of the risks and consequences of leaving AMA.

## 2023-12-05 ENCOUNTER — APPOINTMENT (OUTPATIENT)
Dept: CARDIOLOGY | Facility: CLINIC | Age: 53
End: 2023-12-05
Payer: SELF-PAY

## 2023-12-05 VITALS
BODY MASS INDEX: 37.22 KG/M2 | HEIGHT: 70 IN | TEMPERATURE: 97.6 F | WEIGHT: 260 LBS | DIASTOLIC BLOOD PRESSURE: 110 MMHG | HEART RATE: 90 BPM | SYSTOLIC BLOOD PRESSURE: 160 MMHG

## 2023-12-05 VITALS — DIASTOLIC BLOOD PRESSURE: 100 MMHG | SYSTOLIC BLOOD PRESSURE: 160 MMHG

## 2023-12-05 DIAGNOSIS — Z87.09 PERSONAL HISTORY OF OTHER DISEASES OF THE RESPIRATORY SYSTEM: ICD-10-CM

## 2023-12-05 DIAGNOSIS — Z87.891 PERSONAL HISTORY OF NICOTINE DEPENDENCE: ICD-10-CM

## 2023-12-05 DIAGNOSIS — I50.30 UNSPECIFIED DIASTOLIC (CONGESTIVE) HEART FAILURE: ICD-10-CM

## 2023-12-05 DIAGNOSIS — I25.10 ATHEROSCLEROTIC HEART DISEASE OF NATIVE CORONARY ARTERY W/OUT ANGINA PECTORIS: ICD-10-CM

## 2023-12-05 DIAGNOSIS — R73.03 PREDIABETES.: ICD-10-CM

## 2023-12-05 DIAGNOSIS — I10 ESSENTIAL (PRIMARY) HYPERTENSION: ICD-10-CM

## 2023-12-05 PROCEDURE — ZZZZZ: CPT

## 2023-12-05 RX ORDER — ATORVASTATIN CALCIUM 80 MG/1
80 TABLET, FILM COATED ORAL DAILY
Refills: 0 | Status: ACTIVE | COMMUNITY

## 2023-12-05 RX ORDER — ATENOLOL 50 MG/1
50 TABLET ORAL DAILY
Refills: 0 | Status: ACTIVE | COMMUNITY

## 2023-12-05 RX ORDER — LOSARTAN POTASSIUM 100 MG/1
100 TABLET, FILM COATED ORAL DAILY
Refills: 0 | Status: ACTIVE | COMMUNITY

## 2023-12-05 RX ORDER — AMLODIPINE BESYLATE 10 MG/1
10 TABLET ORAL DAILY
Refills: 0 | Status: ACTIVE | COMMUNITY

## 2023-12-05 RX ORDER — METFORMIN HYDROCHLORIDE 1000 MG/1
1000 TABLET, COATED ORAL TWICE DAILY
Refills: 0 | Status: ACTIVE | COMMUNITY

## 2023-12-05 RX ORDER — FUROSEMIDE 40 MG/1
40 TABLET ORAL DAILY
Refills: 0 | Status: ACTIVE | COMMUNITY

## 2023-12-05 RX ORDER — CARVEDILOL 6.25 MG/1
6.25 TABLET, FILM COATED ORAL TWICE DAILY
Qty: 180 | Refills: 3 | Status: ACTIVE | COMMUNITY
Start: 2023-12-05 | End: 1900-01-01

## 2023-12-08 DIAGNOSIS — E78.5 HYPERLIPIDEMIA, UNSPECIFIED: ICD-10-CM

## 2023-12-08 DIAGNOSIS — Z79.84 LONG TERM (CURRENT) USE OF ORAL HYPOGLYCEMIC DRUGS: ICD-10-CM

## 2023-12-08 DIAGNOSIS — I16.0 HYPERTENSIVE URGENCY: ICD-10-CM

## 2023-12-08 DIAGNOSIS — I21.4 NON-ST ELEVATION (NSTEMI) MYOCARDIAL INFARCTION: ICD-10-CM

## 2023-12-08 DIAGNOSIS — Z53.29 PROCEDURE AND TREATMENT NOT CARRIED OUT BECAUSE OF PATIENT'S DECISION FOR OTHER REASONS: ICD-10-CM

## 2023-12-08 DIAGNOSIS — F17.210 NICOTINE DEPENDENCE, CIGARETTES, UNCOMPLICATED: ICD-10-CM

## 2023-12-08 DIAGNOSIS — I10 ESSENTIAL (PRIMARY) HYPERTENSION: ICD-10-CM

## 2023-12-08 DIAGNOSIS — E11.9 TYPE 2 DIABETES MELLITUS WITHOUT COMPLICATIONS: ICD-10-CM

## 2023-12-08 DIAGNOSIS — Z79.82 LONG TERM (CURRENT) USE OF ASPIRIN: ICD-10-CM

## 2024-01-23 ENCOUNTER — APPOINTMENT (OUTPATIENT)
Dept: CARDIOLOGY | Facility: CLINIC | Age: 54
End: 2024-01-23

## 2025-03-17 ENCOUNTER — APPOINTMENT (OUTPATIENT)
Dept: NEPHROLOGY | Facility: CLINIC | Age: 55
End: 2025-03-17
Payer: COMMERCIAL

## 2025-03-17 VITALS
SYSTOLIC BLOOD PRESSURE: 136 MMHG | HEART RATE: 70 BPM | BODY MASS INDEX: 37.94 KG/M2 | OXYGEN SATURATION: 93 % | WEIGHT: 265 LBS | HEIGHT: 70 IN | DIASTOLIC BLOOD PRESSURE: 88 MMHG

## 2025-03-17 DIAGNOSIS — R60.9 EDEMA, UNSPECIFIED: ICD-10-CM

## 2025-03-17 DIAGNOSIS — N18.31 CHRONIC KIDNEY DISEASE, STAGE 3A: ICD-10-CM

## 2025-03-17 DIAGNOSIS — Z84.89 FAMILY HISTORY OF OTHER SPECIFIED CONDITIONS: ICD-10-CM

## 2025-03-17 DIAGNOSIS — E83.9 CHRONIC KIDNEY DISEASE, UNSPECIFIED: ICD-10-CM

## 2025-03-17 DIAGNOSIS — N18.31 TYPE 2 DIABETES MELLITUS WITH DIABETIC CHRONIC KIDNEY DISEASE: ICD-10-CM

## 2025-03-17 DIAGNOSIS — E11.22 TYPE 2 DIABETES MELLITUS WITH DIABETIC CHRONIC KIDNEY DISEASE: ICD-10-CM

## 2025-03-17 DIAGNOSIS — E78.5 HYPERLIPIDEMIA, UNSPECIFIED: ICD-10-CM

## 2025-03-17 DIAGNOSIS — R06.09 OTHER FORMS OF DYSPNEA: ICD-10-CM

## 2025-03-17 DIAGNOSIS — F17.200 NICOTINE DEPENDENCE, UNSPECIFIED, UNCOMPLICATED: ICD-10-CM

## 2025-03-17 DIAGNOSIS — N52.9 MALE ERECTILE DYSFUNCTION, UNSPECIFIED: ICD-10-CM

## 2025-03-17 DIAGNOSIS — R80.1 PERSISTENT PROTEINURIA, UNSPECIFIED: ICD-10-CM

## 2025-03-17 DIAGNOSIS — N18.9 CHRONIC KIDNEY DISEASE, UNSPECIFIED: ICD-10-CM

## 2025-03-17 DIAGNOSIS — Z82.49 FAMILY HISTORY OF ISCHEMIC HEART DISEASE AND OTHER DISEASES OF THE CIRCULATORY SYSTEM: ICD-10-CM

## 2025-03-17 DIAGNOSIS — M89.9 CHRONIC KIDNEY DISEASE, UNSPECIFIED: ICD-10-CM

## 2025-03-17 DIAGNOSIS — Z83.3 FAMILY HISTORY OF DIABETES MELLITUS: ICD-10-CM

## 2025-03-17 LAB
BILIRUB UR QL STRIP: NORMAL
CLARITY UR: CLEAR
COLLECTION METHOD: NORMAL
GLUCOSE UR-MCNC: NORMAL
HCG UR QL: 0.2 EU/DL
HGB UR QL STRIP.AUTO: NORMAL
KETONES UR-MCNC: NORMAL
LEUKOCYTE ESTERASE UR QL STRIP: NORMAL
NITRITE UR QL STRIP: NORMAL
PH UR STRIP: 7
PROT UR STRIP-MCNC: 100
SP GR UR STRIP: 1.02

## 2025-03-17 PROCEDURE — 99205 OFFICE O/P NEW HI 60 MIN: CPT

## 2025-03-17 PROCEDURE — 81002 URINALYSIS NONAUTO W/O SCOPE: CPT

## 2025-03-17 RX ORDER — FUROSEMIDE 20 MG/1
20 TABLET ORAL
Qty: 30 | Refills: 0 | Status: ACTIVE | COMMUNITY
Start: 2025-03-17 | End: 1900-01-01

## 2025-03-17 RX ORDER — TIRZEPATIDE 2.5 MG/.5ML
2.5 INJECTION, SOLUTION SUBCUTANEOUS
Qty: 12 | Refills: 1 | Status: ACTIVE | COMMUNITY
Start: 2025-03-17 | End: 1900-01-01

## 2025-03-17 RX ORDER — DAPAGLIFLOZIN 5 MG/1
5 TABLET, FILM COATED ORAL
Qty: 90 | Refills: 1 | Status: ACTIVE | COMMUNITY
Start: 2025-03-17 | End: 1900-01-01

## 2025-03-17 RX ORDER — ERGOCALCIFEROL 1.25 MG/1
1.25 MG CAPSULE ORAL
Qty: 8 | Refills: 0 | Status: ACTIVE | COMMUNITY
Start: 2025-03-17 | End: 1900-01-01

## 2025-03-18 LAB
CREAT SPEC-SCNC: 69 MG/DL
CREAT SPEC-SCNC: 71 MG/DL
CREAT/PROT UR: 1.1 RATIO
MICROALBUMIN 24H UR DL<=1MG/L-MCNC: 51.5 MG/DL
MICROALBUMIN/CREAT 24H UR-RTO: 749 MG/G
PROT UR-MCNC: 76 MG/DL

## 2025-03-18 RX ORDER — TADALAFIL 5 MG/1
5 TABLET ORAL
Qty: 20 | Refills: 0 | Status: ACTIVE | COMMUNITY

## 2025-04-07 ENCOUNTER — RESULT REVIEW (OUTPATIENT)
Age: 55
End: 2025-04-07

## 2025-04-07 ENCOUNTER — OUTPATIENT (OUTPATIENT)
Dept: OUTPATIENT SERVICES | Facility: HOSPITAL | Age: 55
LOS: 1 days | End: 2025-04-07
Payer: COMMERCIAL

## 2025-04-07 DIAGNOSIS — Z00.8 ENCOUNTER FOR OTHER GENERAL EXAMINATION: ICD-10-CM

## 2025-04-07 DIAGNOSIS — N18.31 CHRONIC KIDNEY DISEASE, STAGE 3A: ICD-10-CM

## 2025-04-07 PROCEDURE — 76770 US EXAM ABDO BACK WALL COMP: CPT | Mod: 26

## 2025-04-07 PROCEDURE — 76770 US EXAM ABDO BACK WALL COMP: CPT

## 2025-04-08 DIAGNOSIS — N18.31 CHRONIC KIDNEY DISEASE, STAGE 3A: ICD-10-CM

## 2025-04-08 DIAGNOSIS — N20.0 CALCULUS OF KIDNEY: ICD-10-CM

## 2025-04-28 NOTE — PATIENT PROFILE ADULT - NSPRESCRALCFREQ_GEN_A_NUR
Quality 130: Documentation Of Current Medications In The Medical Record: Current Medications Documented
Quality 431: Preventive Care And Screening: Unhealthy Alcohol Use - Screening: Patient not identified as an unhealthy alcohol user when screened for unhealthy alcohol use using a systematic screening method
Detail Level: Detailed
Quality 226: Preventive Care And Screening: Tobacco Use: Screening And Cessation Intervention: Patient screened for tobacco use and is an ex/non-smoker
Never

## 2025-05-06 ENCOUNTER — LABORATORY RESULT (OUTPATIENT)
Age: 55
End: 2025-05-06

## 2025-05-06 ENCOUNTER — APPOINTMENT (OUTPATIENT)
Dept: UROLOGY | Facility: CLINIC | Age: 55
End: 2025-05-06
Payer: COMMERCIAL

## 2025-05-06 DIAGNOSIS — N20.0 CALCULUS OF KIDNEY: ICD-10-CM

## 2025-05-06 DIAGNOSIS — R97.20 ELEVATED PROSTATE, SPECIFIC ANTIGEN [PSA]: ICD-10-CM

## 2025-05-06 DIAGNOSIS — N40.0 BENIGN PROSTATIC HYPERPLASIA WITHOUT LOWER URINARY TRACT SYMPMS: ICD-10-CM

## 2025-05-06 PROCEDURE — 99204 OFFICE O/P NEW MOD 45 MIN: CPT

## 2025-05-06 PROCEDURE — G2211 COMPLEX E/M VISIT ADD ON: CPT | Mod: NC

## 2025-05-06 PROCEDURE — 81003 URINALYSIS AUTO W/O SCOPE: CPT | Mod: QW

## 2025-05-07 ENCOUNTER — NON-APPOINTMENT (OUTPATIENT)
Age: 55
End: 2025-05-07

## 2025-05-07 LAB
BILIRUB UR QL STRIP: NORMAL
COLLECTION METHOD: NORMAL
GLUCOSE UR-MCNC: NORMAL
HCG UR QL: NORMAL EU/DL
HGB UR QL STRIP.AUTO: NORMAL
KETONES UR-MCNC: NORMAL
LEUKOCYTE ESTERASE UR QL STRIP: NORMAL
NITRITE UR QL STRIP: NORMAL
PH UR STRIP: 6
PROT UR STRIP-MCNC: NORMAL
SP GR UR STRIP: >=1.03

## 2025-05-08 LAB — BACTERIA UR CULT: NORMAL

## 2025-05-21 ENCOUNTER — NON-APPOINTMENT (OUTPATIENT)
Age: 55
End: 2025-05-21

## 2025-05-28 ENCOUNTER — APPOINTMENT (OUTPATIENT)
Dept: NEPHROLOGY | Facility: CLINIC | Age: 55
End: 2025-05-28
Payer: COMMERCIAL

## 2025-05-28 VITALS
DIASTOLIC BLOOD PRESSURE: 110 MMHG | HEART RATE: 99 BPM | SYSTOLIC BLOOD PRESSURE: 180 MMHG | BODY MASS INDEX: 37.37 KG/M2 | HEIGHT: 70 IN | OXYGEN SATURATION: 97 % | WEIGHT: 261 LBS

## 2025-05-28 VITALS — DIASTOLIC BLOOD PRESSURE: 104 MMHG | SYSTOLIC BLOOD PRESSURE: 162 MMHG

## 2025-05-28 DIAGNOSIS — N18.31 CHRONIC KIDNEY DISEASE, STAGE 3A: ICD-10-CM

## 2025-05-28 PROCEDURE — 99214 OFFICE O/P EST MOD 30 MIN: CPT

## 2025-05-28 RX ORDER — FINERENONE 10 MG/1
10 TABLET, FILM COATED ORAL
Qty: 90 | Refills: 1 | Status: ACTIVE | COMMUNITY
Start: 2025-05-28 | End: 1900-01-01

## 2025-05-28 RX ORDER — DULAGLUTIDE 0.75 MG/.5ML
0.75 INJECTION, SOLUTION SUBCUTANEOUS
Qty: 10 | Refills: 1 | Status: ACTIVE | COMMUNITY
Start: 2025-05-28 | End: 1900-01-01

## 2025-05-28 RX ORDER — LOSARTAN POTASSIUM 100 MG/1
100 TABLET, FILM COATED ORAL DAILY
Qty: 1 | Refills: 2 | Status: ACTIVE | COMMUNITY
Start: 2025-05-28 | End: 1900-01-01

## 2025-06-03 ENCOUNTER — APPOINTMENT (OUTPATIENT)
Dept: PULMONOLOGY | Facility: CLINIC | Age: 55
End: 2025-06-03
Payer: COMMERCIAL

## 2025-06-03 VITALS
BODY MASS INDEX: 38.37 KG/M2 | RESPIRATION RATE: 15 BRPM | OXYGEN SATURATION: 97 % | HEIGHT: 70 IN | HEART RATE: 118 BPM | SYSTOLIC BLOOD PRESSURE: 148 MMHG | WEIGHT: 268 LBS | DIASTOLIC BLOOD PRESSURE: 94 MMHG

## 2025-06-03 DIAGNOSIS — G47.33 OBSTRUCTIVE SLEEP APNEA (ADULT) (PEDIATRIC): ICD-10-CM

## 2025-06-03 DIAGNOSIS — Z86.39 PERSONAL HISTORY OF OTHER ENDOCRINE, NUTRITIONAL AND METABOLIC DISEASE: ICD-10-CM

## 2025-06-03 DIAGNOSIS — F17.200 NICOTINE DEPENDENCE, UNSPECIFIED, UNCOMPLICATED: ICD-10-CM

## 2025-06-03 DIAGNOSIS — Z12.2 ENCOUNTER FOR SCREENING FOR MALIGNANT NEOPLASM OF RESPIRATORY ORGANS: ICD-10-CM

## 2025-06-03 DIAGNOSIS — E66.01 MORBID (SEVERE) OBESITY DUE TO EXCESS CALORIES: ICD-10-CM

## 2025-06-03 DIAGNOSIS — Z86.79 PERSONAL HISTORY OF OTHER DISEASES OF THE CIRCULATORY SYSTEM: ICD-10-CM

## 2025-06-03 DIAGNOSIS — J44.9 CHRONIC OBSTRUCTIVE PULMONARY DISEASE, UNSPECIFIED: ICD-10-CM

## 2025-06-03 PROCEDURE — G2211 COMPLEX E/M VISIT ADD ON: CPT | Mod: NC

## 2025-06-03 PROCEDURE — 99204 OFFICE O/P NEW MOD 45 MIN: CPT

## 2025-06-03 RX ORDER — UMECLIDINIUM 62.5 UG/1
62.5 AEROSOL, POWDER ORAL
Qty: 1 | Refills: 3 | Status: ACTIVE | COMMUNITY
Start: 2025-06-03 | End: 1900-01-01

## 2025-06-09 ENCOUNTER — APPOINTMENT (OUTPATIENT)
Dept: PULMONOLOGY | Facility: HOSPITAL | Age: 55
End: 2025-06-09

## 2025-07-14 ENCOUNTER — APPOINTMENT (OUTPATIENT)
Dept: UROLOGY | Facility: CLINIC | Age: 55
End: 2025-07-14

## 2025-08-05 ENCOUNTER — APPOINTMENT (OUTPATIENT)
Dept: PULMONOLOGY | Facility: CLINIC | Age: 55
End: 2025-08-05

## 2025-08-25 ENCOUNTER — APPOINTMENT (OUTPATIENT)
Dept: NEPHROLOGY | Facility: CLINIC | Age: 55
End: 2025-08-25
Payer: COMMERCIAL

## 2025-08-25 VITALS
OXYGEN SATURATION: 97 % | HEIGHT: 70 IN | DIASTOLIC BLOOD PRESSURE: 100 MMHG | SYSTOLIC BLOOD PRESSURE: 152 MMHG | HEART RATE: 110 BPM | WEIGHT: 263 LBS | BODY MASS INDEX: 37.65 KG/M2

## 2025-08-25 DIAGNOSIS — N18.31 CHRONIC KIDNEY DISEASE, STAGE 3A: ICD-10-CM

## 2025-08-25 DIAGNOSIS — K04.7 PERIAPICAL ABSCESS W/OUT SINUS: ICD-10-CM

## 2025-08-25 PROCEDURE — 99214 OFFICE O/P EST MOD 30 MIN: CPT

## 2025-08-25 RX ORDER — AMOXICILLIN 875 MG/1
875 TABLET, FILM COATED ORAL 3 TIMES DAILY
Qty: 21 | Refills: 0 | Status: ACTIVE | COMMUNITY
Start: 2025-08-25 | End: 1900-01-01

## 2025-09-08 ENCOUNTER — APPOINTMENT (OUTPATIENT)
Dept: PULMONOLOGY | Facility: CLINIC | Age: 55
End: 2025-09-08